# Patient Record
Sex: MALE | Race: WHITE | NOT HISPANIC OR LATINO | Employment: OTHER | URBAN - METROPOLITAN AREA
[De-identification: names, ages, dates, MRNs, and addresses within clinical notes are randomized per-mention and may not be internally consistent; named-entity substitution may affect disease eponyms.]

---

## 2023-05-15 DIAGNOSIS — M15.9 GENERALIZED OSTEOARTHRITIS: Primary | ICD-10-CM

## 2023-05-15 RX ORDER — MELOXICAM 15 MG/1
TABLET ORAL
Qty: 90 TABLET | Refills: 3 | Status: SHIPPED | OUTPATIENT
Start: 2023-05-15 | End: 2024-03-19 | Stop reason: SDUPTHER

## 2024-01-02 PROBLEM — D22.39 MELANOCYTIC NEVI OF OTHER PARTS OF FACE: Status: ACTIVE | Noted: 2023-03-09

## 2024-01-02 PROBLEM — I10 BENIGN ESSENTIAL HTN: Status: ACTIVE | Noted: 2024-01-02

## 2024-01-02 PROBLEM — D22.4 MELANOCYTIC NEVI OF SCALP AND NECK: Status: ACTIVE | Noted: 2023-03-09

## 2024-01-02 PROBLEM — D18.01 HEMANGIOMA OF SKIN AND SUBCUTANEOUS TISSUE: Status: ACTIVE | Noted: 2023-03-09

## 2024-01-02 PROBLEM — M19.049 ARTHRITIS, DEGENERATIVE, LOCALIZED, PRIMARY, HAND: Status: ACTIVE | Noted: 2024-01-02

## 2024-01-02 PROBLEM — D48.5 NEOPLASM OF UNCERTAIN BEHAVIOR OF SKIN: Status: ACTIVE | Noted: 2023-03-09

## 2024-01-02 PROBLEM — E78.2 COMBINED HYPERLIPIDEMIA: Status: ACTIVE | Noted: 2024-01-02

## 2024-01-02 PROBLEM — N40.0 BPH (BENIGN PROSTATIC HYPERPLASIA): Status: ACTIVE | Noted: 2024-01-02

## 2024-01-02 PROBLEM — L81.4 OTHER MELANIN HYPERPIGMENTATION: Status: ACTIVE | Noted: 2023-03-09

## 2024-01-02 PROBLEM — J01.90 ACUTE SINUSITIS: Status: ACTIVE | Noted: 2024-01-02

## 2024-01-02 PROBLEM — D22.5 MELANOCYTIC NEVI OF TRUNK: Status: ACTIVE | Noted: 2023-03-09

## 2024-01-02 PROBLEM — H91.90 HEARING LOSS: Status: ACTIVE | Noted: 2024-01-02

## 2024-01-02 PROBLEM — R79.89 ELEVATED SERUM CREATININE: Status: ACTIVE | Noted: 2024-01-02

## 2024-01-02 PROBLEM — Z85.828 PERSONAL HISTORY OF OTHER MALIGNANT NEOPLASM OF SKIN: Status: ACTIVE | Noted: 2023-03-09

## 2024-01-02 PROBLEM — L30.9 ECZEMA: Status: ACTIVE | Noted: 2024-01-02

## 2024-01-02 PROBLEM — L71.9 ROSACEA, UNSPECIFIED: Status: ACTIVE | Noted: 2023-03-09

## 2024-01-02 PROBLEM — R00.1 BRADYCARDIA: Status: ACTIVE | Noted: 2024-01-02

## 2024-01-02 PROBLEM — D22.60 MELANOCYTIC NEVI OF UNSPECIFIED UPPER LIMB, INCLUDING SHOULDER: Status: ACTIVE | Noted: 2023-03-09

## 2024-01-02 PROBLEM — I49.5 SINUS NODE DYSFUNCTION (MULTI): Status: ACTIVE | Noted: 2024-01-02

## 2024-01-02 PROBLEM — L57.9 SKIN CHANGES DUE TO CHRONIC EXPOSURE TO NONIONIZING RADIATION, UNSPECIFIED: Status: ACTIVE | Noted: 2023-03-09

## 2024-01-02 PROBLEM — I72.3 ILIAC ARTERY ANEURYSM (CMS-HCC): Status: ACTIVE | Noted: 2024-01-02

## 2024-01-02 PROBLEM — L57.0 ACTINIC KERATOSIS: Status: ACTIVE | Noted: 2023-03-09

## 2024-01-02 PROBLEM — J06.9 ACUTE UPPER RESPIRATORY INFECTION: Status: ACTIVE | Noted: 2024-01-02

## 2024-01-02 PROBLEM — M54.16 ACUTE LUMBAR RADICULOPATHY: Status: ACTIVE | Noted: 2024-01-02

## 2024-01-02 PROBLEM — M25.649 STIFFNESS OF FINGER JOINT: Status: ACTIVE | Noted: 2024-01-02

## 2024-01-02 PROBLEM — R21 RASH: Status: ACTIVE | Noted: 2024-01-02

## 2024-01-02 PROBLEM — D22.70 MELANOCYTIC NEVI OF UNSPECIFIED LOWER LIMB, INCLUDING HIP: Status: ACTIVE | Noted: 2023-03-09

## 2024-01-02 PROBLEM — K56.50 INTESTINAL ADHESIONS WITH OBSTRUCTION (MULTI): Status: ACTIVE | Noted: 2024-01-02

## 2024-01-02 PROBLEM — L90.5 SCAR CONDITION AND FIBROSIS OF SKIN: Status: ACTIVE | Noted: 2023-03-09

## 2024-01-02 PROBLEM — E78.5 DYSLIPIDEMIA: Status: ACTIVE | Noted: 2024-01-02

## 2024-01-03 ENCOUNTER — OFFICE VISIT (OUTPATIENT)
Dept: CARDIOLOGY | Facility: HOSPITAL | Age: 79
End: 2024-01-03
Payer: MEDICARE

## 2024-01-03 VITALS
BODY MASS INDEX: 24.48 KG/M2 | HEART RATE: 44 BPM | SYSTOLIC BLOOD PRESSURE: 130 MMHG | DIASTOLIC BLOOD PRESSURE: 73 MMHG | HEIGHT: 70 IN | WEIGHT: 171 LBS

## 2024-01-03 DIAGNOSIS — I72.3 ILIAC ARTERY ANEURYSM (CMS-HCC): ICD-10-CM

## 2024-01-03 DIAGNOSIS — R00.1 BRADYCARDIA: Primary | ICD-10-CM

## 2024-01-03 DIAGNOSIS — I10 BENIGN ESSENTIAL HTN: ICD-10-CM

## 2024-01-03 DIAGNOSIS — E78.5 DYSLIPIDEMIA: ICD-10-CM

## 2024-01-03 PROCEDURE — 1160F RVW MEDS BY RX/DR IN RCRD: CPT | Performed by: INTERNAL MEDICINE

## 2024-01-03 PROCEDURE — 93010 ELECTROCARDIOGRAM REPORT: CPT | Performed by: INTERNAL MEDICINE

## 2024-01-03 PROCEDURE — 1036F TOBACCO NON-USER: CPT | Performed by: INTERNAL MEDICINE

## 2024-01-03 PROCEDURE — 1159F MED LIST DOCD IN RCRD: CPT | Performed by: INTERNAL MEDICINE

## 2024-01-03 PROCEDURE — 3075F SYST BP GE 130 - 139MM HG: CPT | Performed by: INTERNAL MEDICINE

## 2024-01-03 PROCEDURE — 1126F AMNT PAIN NOTED NONE PRSNT: CPT | Performed by: INTERNAL MEDICINE

## 2024-01-03 PROCEDURE — 99214 OFFICE O/P EST MOD 30 MIN: CPT | Performed by: INTERNAL MEDICINE

## 2024-01-03 PROCEDURE — 3078F DIAST BP <80 MM HG: CPT | Performed by: INTERNAL MEDICINE

## 2024-01-03 PROCEDURE — 93005 ELECTROCARDIOGRAM TRACING: CPT | Performed by: INTERNAL MEDICINE

## 2024-01-03 RX ORDER — MELOXICAM 15 MG/1
15 TABLET ORAL DAILY
COMMUNITY
Start: 2014-10-21 | End: 2024-01-03 | Stop reason: SDUPTHER

## 2024-01-03 RX ORDER — MAG HYDROX/ALUMINUM HYD/SIMETH 400-400-40
1 SUSPENSION, ORAL (FINAL DOSE FORM) ORAL DAILY
COMMUNITY

## 2024-01-03 RX ORDER — MELATONIN 10 MG
1 CAPSULE ORAL NIGHTLY
COMMUNITY
Start: 2019-05-15

## 2024-01-03 RX ORDER — TAMSULOSIN HYDROCHLORIDE 0.4 MG/1
0.4 CAPSULE ORAL DAILY
COMMUNITY
Start: 2019-12-09 | End: 2024-02-04

## 2024-01-03 RX ORDER — LISINOPRIL AND HYDROCHLOROTHIAZIDE 20; 25 MG/1; MG/1
1 TABLET ORAL DAILY
COMMUNITY
Start: 2017-11-29 | End: 2024-02-04

## 2024-01-03 RX ORDER — TRIAMCINOLONE ACETONIDE 1 MG/G
1 CREAM TOPICAL 2 TIMES DAILY
COMMUNITY
Start: 2023-09-30

## 2024-01-03 RX ORDER — ATORVASTATIN CALCIUM 40 MG/1
40 TABLET, FILM COATED ORAL DAILY
COMMUNITY
Start: 2017-11-30 | End: 2024-02-05

## 2024-01-03 ASSESSMENT — PAIN SCALES - GENERAL: PAINLEVEL: 0-NO PAIN

## 2024-01-03 ASSESSMENT — ENCOUNTER SYMPTOMS
OCCASIONAL FEELINGS OF UNSTEADINESS: 0
DEPRESSION: 0
LOSS OF SENSATION IN FEET: 0

## 2024-01-03 NOTE — PROGRESS NOTES
Primary Care Physician: Monet Wheeler MD  Date of Visit: 01/03/2024 11:00 AM EST  Location of visit: [facility]     Chief Complaint:   Chief Complaint   Patient presents with    Follow-up     1 year fuv        HPI / Summary:   Enrique Myers is a 78 y.o. male presents for followup.  He has no cardiac complaints.  He is physically active and swims up to 1 mile 3 days a week in addition to walking without chest pain or shortness of breath.  The patient denies chest pain, shortness of breath, palpitations, lightheadedness, syncope, orthopnea, paroxysmal nocturnal dyspnea, lower extremity edema, or bleeding problems.          Past Medical History:   Diagnosis Date    Personal history of other malignant neoplasm of skin 10/14/2015    History of squamous cell carcinoma of skin        Past Surgical History:   Procedure Laterality Date    HERNIA REPAIR  11/11/2014    Inguinal Hernia Repair    TONSILLECTOMY  11/11/2014    Tonsillectomy          Social History:   reports that he has never smoked. He has never used smokeless tobacco. He reports current alcohol use of about 12.0 standard drinks of alcohol per week. He reports current drug use. Drug: Marijuana.     Family History:  family history is not on file.      Allergies:  No Known Allergies    Outpatient Medications:  Current Outpatient Medications   Medication Instructions    atorvastatin (LIPITOR) 40 mg, oral, Daily    lisinopriL-hydrochlorothiazide 20-25 mg tablet 1 tablet, oral, Daily    melatonin 10 mg capsule 1 capsule, oral, Nightly    meloxicam (Mobic) 15 mg tablet TAKE 1 TABLET BY MOUTH DAILY  WITH FOOD AS NEEDED    saw palmetto 450 mg capsule 1 capsule, oral, Daily    tamsulosin (FLOMAX) 0.4 mg, oral, Daily    triamcinolone (Kenalog) 0.1 % cream 1 Application, Topical, 2 times daily       Physical Exam:  Vitals:    01/03/24 1121   BP: 130/73   BP Location: Left arm   Patient Position: Sitting   BP Cuff Size: Adult   Pulse: (!) 44   Weight: 77.6  "kg (171 lb)   Height: 1.778 m (5' 10\")     Wt Readings from Last 5 Encounters:   01/03/24 77.6 kg (171 lb)   01/24/23 76.2 kg (168 lb 0.9 oz)   01/18/23 77.7 kg (171 lb 4 oz)   01/25/22 76.2 kg (168 lb)   01/12/22 77.6 kg (171 lb)     Body mass index is 24.54 kg/m².   General: Well-developed well-nourished in no acute distress  HEENT: Normocephalic atraumatic  Neck: Supple, JVP is normal negative hepatojugular reflux 2+ carotid pulses without bruit  Pulmonary: Normal respiratory effort, clear to auscultation  Cardiovascular: No right ventricular heave, normal S1 and S2, no murmurs rubs or gallops  Abdomen: Soft nontender nondistended  Extremities: Warm without edema 2+ radial pulses bilaterally   Neurologic: Alert and oriented x3  Psychiatric: Normal mood and affect     Last Labs:  CMP:  Recent Labs     01/19/23  1019 01/12/22  1006 01/06/21  1000    136 135*   K 4.0 4.0 4.0    97* 97*   CO2 29 29 27   ANIONGAP 12 14 15   BUN 19 19 21   CREATININE 1.03 1.10 1.09   GLUCOSE 84 97 107*     Recent Labs     01/19/23  1019 01/12/22  1006 01/06/21  1000   ALBUMIN 4.3 4.9 4.9   ALKPHOS 59 64 63   ALT 28 18 19   AST 25 18 20   BILITOT 0.9 0.7 0.9     CBC:  Recent Labs     01/19/23  1019 01/12/22  1006 01/06/21  1000   WBC 4.6 4.9 5.7   HGB 13.6 14.8 14.9   HCT 39.5* 42.4 43.7    195 217   MCV 87 89 90     COAG: No results for input(s): \"INR\", \"DDIMERVTE\" in the last 34462 hours.  HEME/ENDO:  Recent Labs     12/09/19  1259   HGBA1C 5.4      CARDIAC: No results for input(s): \"LDH\", \"CKMB\", \"TROPHS\", \"BNP\" in the last 73773 hours.    No lab exists for component: \"CK\", \"CKMBP\"  Recent Labs     01/19/23  1019 05/20/22  0830 01/31/22  0816   CHOL 141 135 160   LDLF 59 64 81   HDL 73.1 63.0 67.9   TRIG 47 40 54       Last Cardiology Tests:  ECG:  An electrocardiogram performed today that I reviewed shows sinus bradycardia with first-degree AV block.    Echo:         Cath:      Stress Test:           Cardiac " Imaging:  Abdominal ultrasound December 18, 2017  IMPRESSION:  1. Stable left common iliac aneurysm measuring 1.6 cm.  2. No aneurysm involving the abdominal aorta or right common iliac  artery.      Assessment/Plan   Diagnoses and all orders for this visit:  Bradycardia  -     ECG 12 lead (Clinic Performed)  Benign essential HTN  -     Comprehensive Metabolic Panel; Future  -     CBC; Future  Dyslipidemia  -     Lipid Panel; Future  Iliac artery aneurysm (CMS/HCC)  -     Vascular US aorta iliac duplex complete; Future    In summary Mr. Myers is a pleasant 78 year-old white male with a past medical history significant for hypertension, hyperlipidemia, and asymptomatic sinus bradycardia.  He is asymptomatic from a cardiac perspective.  He is physically active without symptoms.  His blood pressure is acceptable.  His last lipid profile was at goal.  I ordered labs as indicated above.  He should continue his current cardiovascular medications.  I ordered an abdominal ultrasound for surveillance of his left iliac artery aneurysm.  We will see him back in follow-up in 1 year.      Orders:  Orders Placed This Encounter   Procedures    Lipid Panel    Comprehensive Metabolic Panel    CBC    ECG 12 lead (Clinic Performed)      Followup Appts:  Future Appointments   Date Time Provider Department Center   3/19/2024 11:00 AM Monet Collazo MD [id number] [facility]   3/28/2024  2:45 PM Brain Vuong MD [id number] [facility]           ____________________________________________________________  Robb Garcia MD  [facility]  [facility]

## 2024-01-08 ENCOUNTER — LAB (OUTPATIENT)
Dept: LAB | Facility: LAB | Age: 79
End: 2024-01-08
Payer: MEDICARE

## 2024-01-08 DIAGNOSIS — E78.5 DYSLIPIDEMIA: ICD-10-CM

## 2024-01-08 DIAGNOSIS — I10 BENIGN ESSENTIAL HTN: ICD-10-CM

## 2024-01-08 LAB
ALBUMIN SERPL BCP-MCNC: 4.5 G/DL (ref 3.4–5)
ALP SERPL-CCNC: 66 U/L (ref 33–136)
ALT SERPL W P-5'-P-CCNC: 19 U/L (ref 10–52)
ANION GAP SERPL CALC-SCNC: 12 MMOL/L (ref 10–20)
AST SERPL W P-5'-P-CCNC: 20 U/L (ref 9–39)
BILIRUB SERPL-MCNC: 1 MG/DL (ref 0–1.2)
BUN SERPL-MCNC: 25 MG/DL (ref 6–23)
CALCIUM SERPL-MCNC: 9.9 MG/DL (ref 8.6–10.6)
CHLORIDE SERPL-SCNC: 101 MMOL/L (ref 98–107)
CHOLEST SERPL-MCNC: 148 MG/DL (ref 0–199)
CHOLESTEROL/HDL RATIO: 2.2
CO2 SERPL-SCNC: 28 MMOL/L (ref 21–32)
CREAT SERPL-MCNC: 1.12 MG/DL (ref 0.5–1.3)
EGFRCR SERPLBLD CKD-EPI 2021: 68 ML/MIN/1.73M*2
ERYTHROCYTE [DISTWIDTH] IN BLOOD BY AUTOMATED COUNT: 12.7 % (ref 11.5–14.5)
GLUCOSE SERPL-MCNC: 110 MG/DL (ref 74–99)
HCT VFR BLD AUTO: 41.5 % (ref 41–52)
HDLC SERPL-MCNC: 67 MG/DL
HGB BLD-MCNC: 13.8 G/DL (ref 13.5–17.5)
LDLC SERPL CALC-MCNC: 68 MG/DL
MCH RBC QN AUTO: 30.3 PG (ref 26–34)
MCHC RBC AUTO-ENTMCNC: 33.3 G/DL (ref 32–36)
MCV RBC AUTO: 91 FL (ref 80–100)
NON HDL CHOLESTEROL: 81 MG/DL (ref 0–149)
NRBC BLD-RTO: 0 /100 WBCS (ref 0–0)
PLATELET # BLD AUTO: 222 X10*3/UL (ref 150–450)
POTASSIUM SERPL-SCNC: 4.1 MMOL/L (ref 3.5–5.3)
PROT SERPL-MCNC: 6.6 G/DL (ref 6.4–8.2)
RBC # BLD AUTO: 4.55 X10*6/UL (ref 4.5–5.9)
SODIUM SERPL-SCNC: 137 MMOL/L (ref 136–145)
TRIGL SERPL-MCNC: 65 MG/DL (ref 0–149)
VLDL: 13 MG/DL (ref 0–40)
WBC # BLD AUTO: 5.3 X10*3/UL (ref 4.4–11.3)

## 2024-01-08 PROCEDURE — 85027 COMPLETE CBC AUTOMATED: CPT

## 2024-01-08 PROCEDURE — 80053 COMPREHEN METABOLIC PANEL: CPT

## 2024-01-08 PROCEDURE — 36415 COLL VENOUS BLD VENIPUNCTURE: CPT

## 2024-01-08 PROCEDURE — 80061 LIPID PANEL: CPT

## 2024-01-16 ENCOUNTER — APPOINTMENT (OUTPATIENT)
Dept: PRIMARY CARE | Facility: CLINIC | Age: 79
End: 2024-01-16
Payer: MEDICARE

## 2024-01-23 ENCOUNTER — APPOINTMENT (OUTPATIENT)
Dept: CARDIOLOGY | Facility: HOSPITAL | Age: 79
End: 2024-01-23
Payer: COMMERCIAL

## 2024-02-04 DIAGNOSIS — E78.5 DYSLIPIDEMIA: Primary | ICD-10-CM

## 2024-02-04 DIAGNOSIS — I10 BENIGN ESSENTIAL HTN: ICD-10-CM

## 2024-02-04 RX ORDER — LISINOPRIL AND HYDROCHLOROTHIAZIDE 20; 25 MG/1; MG/1
1 TABLET ORAL DAILY
Qty: 10 TABLET | Refills: 0 | Status: SHIPPED | OUTPATIENT
Start: 2024-02-04 | End: 2024-03-19 | Stop reason: SDUPTHER

## 2024-02-04 RX ORDER — TAMSULOSIN HYDROCHLORIDE 0.4 MG/1
0.4 CAPSULE ORAL NIGHTLY
Qty: 100 CAPSULE | Refills: 0 | Status: SHIPPED | OUTPATIENT
Start: 2024-02-04 | End: 2024-03-19 | Stop reason: SDUPTHER

## 2024-02-05 RX ORDER — ATORVASTATIN CALCIUM 40 MG/1
40 TABLET, FILM COATED ORAL NIGHTLY
Qty: 90 TABLET | Refills: 3 | Status: SHIPPED | OUTPATIENT
Start: 2024-02-05

## 2024-02-05 NOTE — RESULT ENCOUNTER NOTE
Lipids at goal. CBC was normal. CMP ok except for mildly elevated glucose. Follow up with primary physician.

## 2024-02-18 LAB
ATRIAL RATE: 44 BPM
P AXIS: 54 DEGREES
P OFFSET: 163 MS
P ONSET: 98 MS
PR INTERVAL: 244 MS
Q ONSET: 220 MS
QRS COUNT: 7 BEATS
QRS DURATION: 80 MS
QT INTERVAL: 420 MS
QTC CALCULATION(BAZETT): 359 MS
QTC FREDERICIA: 378 MS
R AXIS: 31 DEGREES
T AXIS: 41 DEGREES
T OFFSET: 430 MS
VENTRICULAR RATE: 44 BPM

## 2024-03-06 NOTE — PROGRESS NOTES
"Subjective     Enrique J Call \"Marcin\" is a 78 y.o. male who presents for the following: Skin Check.      Review of Systems:  No other skin or systemic complaints other than what is documented elsewhere in the note.    The following portions of the chart were reviewed this encounter and updated as appropriate:       Skin Cancer History  No skin cancer on file.    Specialty Problems          Dermatology Problems    Actinic keratosis    Hemangioma of skin and subcutaneous tissue    Melanocytic nevi of other parts of face    Melanocytic nevi of scalp and neck    Melanocytic nevi of trunk    Melanocytic nevi of unspecified lower limb, including hip    Melanocytic nevi of unspecified upper limb, including shoulder    Neoplasm of uncertain behavior of skin    Other melanin hyperpigmentation    Personal history of other malignant neoplasm of skin    Rosacea, unspecified    Scar condition and fibrosis of skin    Skin changes due to chronic exposure to nonionizing radiation, unspecified    Eczema    Rash       Past Dermatologic / Past Relevant Medical History:    - reported history of basal cell carcinoma with squamous features on left ear superior helix s/p Mohs surgery by Dr. Robb Barry in July 2007 and then by Dr. 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    Current Medications / CAM's:  "   Current Outpatient Medications:     atorvastatin (Lipitor) 40 mg tablet, TAKE 1 TABLET BY MOUTH IN THE  EVENING, Disp: 90 tablet, Rfl: 3    lisinopriL-hydrochlorothiazide 20-25 mg tablet, Take 1 tablet by mouth once daily., Disp: 10 tablet, Rfl: 0    melatonin 10 mg capsule, Take 1 capsule (10 mg) by mouth once daily at bedtime., Disp: , Rfl:     meloxicam (Mobic) 15 mg tablet, TAKE 1 TABLET BY MOUTH DAILY  WITH FOOD AS NEEDED, Disp: 90 tablet, Rfl: 3    saw palmetto 450 mg capsule, Take 1 capsule (450 mg) by mouth once daily., Disp: , Rfl:     tamsulosin (Flomax) 0.4 mg 24 hr capsule, Take 1 capsule (0.4 mg) by mouth once daily at bedtime. AT BEDTIME, Disp: 100 capsule, Rfl: 0    triamcinolone (Kenalog) 0.1 % cream, Apply 1 Application topically 2 times a day., Disp: , Rfl:      Objective   Well appearing patient in no apparent distress; mood and affect are within normal limits.    A full examination was performed including scalp, face, eyes, ears, nose, lips, neck, chest, axillae, abdomen, back, bilateral upper extremities, and bilateral lower extremities. All findings within normal limits unless otherwise noted below.    Assessment/Plan   1. Neoplasm of uncertain behavior of skin  Left Ear Mid-Helix  8 mm dark brown pigmented, asymmetric macule with an asymmetric pigment network and irregular borders           Shave removal    Lesion length (cm):  0.8  Margin per side (cm):  0  Lesion diameter (cm):  0.8  Informed consent: discussed and consent obtained    Timeout: patient name, date of birth, surgical site, and procedure verified    Procedure prep:  Patient was prepped and draped  Anesthesia: the lesion was anesthetized in a standard fashion    Anesthetic:  1% lidocaine w/ epinephrine 1-100,000 local infiltration  Instrument used: flexible razor blade    Hemostasis achieved with: aluminum chloride    Outcome: patient tolerated procedure well    Post-procedure details: sterile dressing applied and wound care  instructions given    Dressing type: bandage and petrolatum      Specimen 1 - Dermatopathology- DERM LAB  Differential Diagnosis: lentigo v AMH  Check Margins Yes/No?:    Comments:    Dermpath Lab: Routine Histopathology (formalin-fixed tissue)    2. Actinic keratosis (15)  Head - Anterior (Face) (15)  Scattered on the patient's face and bilateral ears, there are multiple erythematous, gritty, scaly macules     Actinic Keratoses -scattered on face and bilateral ears.  The pre-cancerous nature of these lesions and treatment options were discussed with the patient today.  At this time, I recommend treatment with liquid nitrogen cryotherapy.  The patient expressed understanding, is in agreement with this plan, and wishes to proceed with cryotherapy today.    Destr of lesion - Head - Anterior (Face)  Complexity: simple    Destruction method: cryotherapy    Informed consent: discussed and consent obtained    Lesion destroyed using liquid nitrogen: Yes    Cryotherapy cycles:  1  Outcome: patient tolerated procedure well with no complications    Post-procedure details: wound care instructions given      3. Melanocytic nevus of trunk  Scattered on the patient's face, neck, trunk, and extremities, there are several small, round- to oval-shaped, brown-pigmented and pink-colored, symmetric, uniform-appearing macules and dome-shaped papules    Clinically benign- to slightly atypical-appearing nevi - the clinically benign- to slightly atypical-appearing nature of the patient's nevi was discussed with the patient today.  None of the patient's nevi meet threshold for biopsy today.  I emphasized the importance of performing monthly self-skin exams using the ABCDs of monitoring moles, which were reviewed with the patient today and an informational hand-out provided.  I also emphasized the importance of sun avoidance and sun protection with daily sunscreen use.  The patient expressed understanding and is in agreement with this  plan.    4. Seborrheic keratosis  Scattered on the patient's face, neck, trunk, and extremities, there are multiple tan- to light brown-colored, hyperkeratotic, stuck-on appearing papules of varying size and shape    Seborrheic Keratoses - the benign nature of these lesions was discussed with the patient today and reassurance provided.  No treatment is medically indicated for these lesions at this time.    5. Dermatitis  Right Flank  On the patient's bilateral flanks, the left worse than the right, there are multiple erythematous, scaly papules coalescing into several ill-defined, slightly lichenified, thin plaques    Eczema - flare on bilateral flanks.  The potentially chronic and intermittently flaring nature of this condition and treatment options were discussed extensively with the patient today.  At this time, I recommend topical steroid therapy with Triamcinolone 0.1% cream, which the patient was instructed to apply twice daily to the affected areas of his flanks (avoid face, groin, body folds) for the next 2 weeks, followed by taper to twice daily on weekends only for persistent areas; the patient may repeat treatment in a 2-week burst-and-taper fashion every 6-8 weeks as needed for future flares.  I also emphasized the importance of dry, sensitive skin care, including the use of a mild soap, such as Dove, and frequent and aggressive moisturization, at least twice daily and immediately following showers or baths, with recommended over-the-counter moisturizing creams, such as Eucerin, Cetaphil, Cerave, or Aveeno, or Vaseline or Aquaphor ointments.  The risks, benefits, and side effects of this medication, including possible skin atrophy with overuse of topical steroids, were discussed.  The patient expressed understanding and is in agreement with this plan.    6. History of nonmelanoma skin cancer  On the patient's left ear superior helix and mid lower back, there are well-healed scars with no evidence of  recurrent growth on exam today.    History of nonmelanoma skin cancer, dysplastic nevus, and actinic keratoses and photodamage.  There is no evidence of recurrence on exam today.  The signs and symptoms of skin cancer were reviewed and the patient was advised to practice sun protection and sun avoidance, use daily sunscreen, and perform regular self skin exams.  I will have the patient return to our office in 6 to 12 months, pending the above biopsy result, for routine follow-up and skin exam, and the patient was instructed to call our office should the patient notice any new, changing, symptomatic, or otherwise concerning skin lesions before then.  The patient expressed understanding and is in agreement with this plan.    7. Diffuse photodamage of skin  Photodistributed  Diffuse photodamage with actinic changes with telangiectasia and mottled pigmentation in sun-exposed areas.    Photodamage.  The signs and symptoms of skin cancer were reviewed and the patient was advised to practice sun protection and sun avoidance, use daily sunscreen, and perform regular self skin exams.  Sun protection was discussed, including avoiding the mid-day sun, wearing a sunscreen with SPF at least 50, and stressing the need for reapplication of sunscreen and applying more than they think they need.

## 2024-03-07 ENCOUNTER — OFFICE VISIT (OUTPATIENT)
Dept: DERMATOLOGY | Facility: CLINIC | Age: 79
End: 2024-03-07
Payer: MEDICARE

## 2024-03-07 DIAGNOSIS — Z85.828 HISTORY OF NONMELANOMA SKIN CANCER: ICD-10-CM

## 2024-03-07 DIAGNOSIS — L82.1 SEBORRHEIC KERATOSIS: ICD-10-CM

## 2024-03-07 DIAGNOSIS — L57.0 ACTINIC KERATOSIS: ICD-10-CM

## 2024-03-07 DIAGNOSIS — L57.8 DIFFUSE PHOTODAMAGE OF SKIN: ICD-10-CM

## 2024-03-07 DIAGNOSIS — L30.9 DERMATITIS: ICD-10-CM

## 2024-03-07 DIAGNOSIS — D22.5 MELANOCYTIC NEVUS OF TRUNK: ICD-10-CM

## 2024-03-07 DIAGNOSIS — D48.5 NEOPLASM OF UNCERTAIN BEHAVIOR OF SKIN: Primary | ICD-10-CM

## 2024-03-07 PROCEDURE — 99214 OFFICE O/P EST MOD 30 MIN: CPT | Performed by: DERMATOLOGY

## 2024-03-07 PROCEDURE — 11311 SHAVE SKIN LESION 0.6-1.0 CM: CPT | Performed by: DERMATOLOGY

## 2024-03-07 PROCEDURE — 88342 IMHCHEM/IMCYTCHM 1ST ANTB: CPT | Performed by: DERMATOLOGY

## 2024-03-07 PROCEDURE — 1126F AMNT PAIN NOTED NONE PRSNT: CPT | Performed by: DERMATOLOGY

## 2024-03-07 PROCEDURE — 88305 TISSUE EXAM BY PATHOLOGIST: CPT | Performed by: DERMATOLOGY

## 2024-03-07 PROCEDURE — 1159F MED LIST DOCD IN RCRD: CPT | Performed by: DERMATOLOGY

## 2024-03-07 PROCEDURE — 1036F TOBACCO NON-USER: CPT | Performed by: DERMATOLOGY

## 2024-03-07 PROCEDURE — 17004 DESTROY PREMAL LESIONS 15/>: CPT | Performed by: DERMATOLOGY

## 2024-03-07 PROCEDURE — 1160F RVW MEDS BY RX/DR IN RCRD: CPT | Performed by: DERMATOLOGY

## 2024-03-07 ASSESSMENT — DERMATOLOGY PATIENT ASSESSMENT
ARE YOU AN ORGAN TRANSPLANT RECIPIENT: NO
DO YOU HAVE ANY NEW OR CHANGING LESIONS: NO
DO YOU USE SUNSCREEN: OCCASIONALLY
DO YOU USE A TANNING BED: NO

## 2024-03-07 ASSESSMENT — DERMATOLOGY QUALITY OF LIFE (QOL) ASSESSMENT: ARE THERE EXCLUSIONS OR EXCEPTIONS FOR THE QUALITY OF LIFE ASSESSMENT: NO

## 2024-03-07 ASSESSMENT — ITCH NUMERIC RATING SCALE: HOW SEVERE IS YOUR ITCHING?: 0

## 2024-03-13 LAB
LAB AP ASR DISCLAIMER: NORMAL
LABORATORY COMMENT REPORT: NORMAL
PATH REPORT.FINAL DX SPEC: NORMAL
PATH REPORT.GROSS SPEC: NORMAL
PATH REPORT.RELEVANT HX SPEC: NORMAL
PATH REPORT.TOTAL CANCER: NORMAL

## 2024-03-19 ENCOUNTER — OFFICE VISIT (OUTPATIENT)
Dept: PRIMARY CARE | Facility: CLINIC | Age: 79
End: 2024-03-19
Payer: MEDICARE

## 2024-03-19 VITALS
WEIGHT: 169 LBS | SYSTOLIC BLOOD PRESSURE: 102 MMHG | DIASTOLIC BLOOD PRESSURE: 67 MMHG | BODY MASS INDEX: 23.66 KG/M2 | HEIGHT: 71 IN | OXYGEN SATURATION: 96 % | HEART RATE: 57 BPM

## 2024-03-19 DIAGNOSIS — Z12.5 ENCOUNTER FOR PROSTATE CANCER SCREENING: ICD-10-CM

## 2024-03-19 DIAGNOSIS — M15.9 GENERALIZED OSTEOARTHRITIS: ICD-10-CM

## 2024-03-19 DIAGNOSIS — R73.9 BORDERLINE HYPERGLYCEMIA: Primary | ICD-10-CM

## 2024-03-19 DIAGNOSIS — C43.9 MELANOMA OF SKIN (MULTI): ICD-10-CM

## 2024-03-19 DIAGNOSIS — Z12.11 COLON CANCER SCREENING: ICD-10-CM

## 2024-03-19 DIAGNOSIS — I10 BENIGN ESSENTIAL HTN: ICD-10-CM

## 2024-03-19 PROCEDURE — 99213 OFFICE O/P EST LOW 20 MIN: CPT | Performed by: INTERNAL MEDICINE

## 2024-03-19 PROCEDURE — 3074F SYST BP LT 130 MM HG: CPT | Performed by: INTERNAL MEDICINE

## 2024-03-19 PROCEDURE — 1159F MED LIST DOCD IN RCRD: CPT | Performed by: INTERNAL MEDICINE

## 2024-03-19 PROCEDURE — 3078F DIAST BP <80 MM HG: CPT | Performed by: INTERNAL MEDICINE

## 2024-03-19 PROCEDURE — G0439 PPPS, SUBSEQ VISIT: HCPCS | Performed by: INTERNAL MEDICINE

## 2024-03-19 PROCEDURE — 1160F RVW MEDS BY RX/DR IN RCRD: CPT | Performed by: INTERNAL MEDICINE

## 2024-03-19 PROCEDURE — 1036F TOBACCO NON-USER: CPT | Performed by: INTERNAL MEDICINE

## 2024-03-19 PROCEDURE — 1170F FXNL STATUS ASSESSED: CPT | Performed by: INTERNAL MEDICINE

## 2024-03-19 RX ORDER — MELOXICAM 15 MG/1
15 TABLET ORAL DAILY
Qty: 90 TABLET | Refills: 3 | Status: SHIPPED | OUTPATIENT
Start: 2024-03-19

## 2024-03-19 RX ORDER — TAMSULOSIN HYDROCHLORIDE 0.4 MG/1
0.4 CAPSULE ORAL NIGHTLY
Qty: 90 CAPSULE | Refills: 3 | Status: SHIPPED | OUTPATIENT
Start: 2024-03-19 | End: 2025-03-19

## 2024-03-19 RX ORDER — LISINOPRIL AND HYDROCHLOROTHIAZIDE 20; 25 MG/1; MG/1
1 TABLET ORAL DAILY
Qty: 90 TABLET | Refills: 3 | Status: SHIPPED | OUTPATIENT
Start: 2024-03-19

## 2024-03-19 ASSESSMENT — PATIENT HEALTH QUESTIONNAIRE - PHQ9
SUM OF ALL RESPONSES TO PHQ9 QUESTIONS 1 AND 2: 0
2. FEELING DOWN, DEPRESSED OR HOPELESS: NOT AT ALL
1. LITTLE INTEREST OR PLEASURE IN DOING THINGS: NOT AT ALL

## 2024-03-19 ASSESSMENT — ACTIVITIES OF DAILY LIVING (ADL)
MANAGING_FINANCES: INDEPENDENT
BATHING: INDEPENDENT
TAKING_MEDICATION: INDEPENDENT
GROCERY_SHOPPING: INDEPENDENT
DOING_HOUSEWORK: INDEPENDENT
DRESSING: INDEPENDENT

## 2024-03-19 ASSESSMENT — ENCOUNTER SYMPTOMS
LOSS OF SENSATION IN FEET: 0
OCCASIONAL FEELINGS OF UNSTEADINESS: 0
DEPRESSION: 0

## 2024-03-19 NOTE — PATIENT INSTRUCTIONS
Marcin,     We are doing a few labs today - the PSA test for prostate cancer screening and a hemoglobin A1C to be sure you have no diabetes or pre-diabetes.   Call [phone] to schedule a colonoscopy with Dr. Kapadia.     CL

## 2024-03-19 NOTE — PROGRESS NOTES
"Marcin Call is a 77 yo M presenting for wellness visit.     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     HTN, bradycardia, DLD - atorva 40, hydrochlorothiazide-lisinopril, FU cardiology (Garcia)     LUTS - tamsulosin     Chronic low back pain, lumbar DJD - meloicam PRN     New dx very early stage melanoma by Dr. Vuong pending Mohs later in the spring     #HM   -screen labs 1.2024   -due PSA   -cscope 4.2019 - due (Steffi)   -shingrix x2   -pneumovax/prevnar 13 UTD     "

## 2024-03-20 DIAGNOSIS — Z12.11 COLON CANCER SCREENING: ICD-10-CM

## 2024-03-20 RX ORDER — POLYETHYLENE GLYCOL 3350, SODIUM SULFATE ANHYDROUS, SODIUM BICARBONATE, SODIUM CHLORIDE, POTASSIUM CHLORIDE 236; 22.74; 6.74; 5.86; 2.97 G/4L; G/4L; G/4L; G/4L; G/4L
4000 POWDER, FOR SOLUTION ORAL ONCE
Qty: 4000 ML | Refills: 0 | Status: SHIPPED | OUTPATIENT
Start: 2024-03-20 | End: 2024-03-20

## 2024-03-20 NOTE — TUMOR BOARD NOTE
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    Recommendation:  Mohs surgery.    Review Multidisciplinary Cutaneous Oncology Conference recommendation with patient.  Continue routine follow up and total body skin exams with Brain Vuong.    Follow Up:  Brain Vuong, Derm Surgery.      History and Physical Exam  Dermatologic History:   78 y.o. 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    Past Medical History:    Past Medical History:   Diagnosis Date    Personal history of other malignant neoplasm of skin 10/14/2015    History of squamous cell carcinoma of skin       Skin:  Left Ear Mid-Helix  8 mm dark brown pigmented, asymmetric macule with an asymmetric pigment network and irregular borders         Pathology  Dermatopathology- DERM LAB: [id number]  Order: [id number]  Collected 3/7/2024 15:09       Status: Final result       Visible to patient: Yes (seen)       Dx: Neoplasm of uncertain behavior of skin    1 Result Note      Component    FINAL DIAGNOSIS   SKIN, LEFT EAR MID-HELIX, SHAVE EXCISION:  ATYPICAL MELANOCYTIC HYPERPLASIA, SEE NOTE.     Note: Microscopic examination reveals a specimen that extends into the superficial dermis. There is dense solar elastosis and there is heavy epidermal melanin pigmentation in the lower epidermis. A SOX-10 stain reveals areas of moderately increased single melanocytes with moderately enlarged nuclei and occasional pagetoid extension. All control slides stain appropriately.     With the cytologic atypia and the degree of  increased melanocytes, while some of this specimen has features of a lentigo, an early melanoma in situ of the lentigo maligna type cannot be excluded and a complete re-excision is recommended.     ** Electronically signed out by Rosa Isela Garcia MD **

## 2024-03-21 ENCOUNTER — LAB (OUTPATIENT)
Dept: LAB | Facility: LAB | Age: 79
End: 2024-03-21
Payer: MEDICARE

## 2024-03-21 DIAGNOSIS — R73.9 BORDERLINE HYPERGLYCEMIA: ICD-10-CM

## 2024-03-21 DIAGNOSIS — Z12.5 ENCOUNTER FOR PROSTATE CANCER SCREENING: ICD-10-CM

## 2024-03-21 LAB
EST. AVERAGE GLUCOSE BLD GHB EST-MCNC: 105 MG/DL
HBA1C MFR BLD: 5.3 %

## 2024-03-21 PROCEDURE — 83036 HEMOGLOBIN GLYCOSYLATED A1C: CPT

## 2024-03-21 PROCEDURE — G0103 PSA SCREENING: HCPCS

## 2024-03-21 PROCEDURE — 84154 ASSAY OF PSA FREE: CPT

## 2024-03-21 PROCEDURE — 36415 COLL VENOUS BLD VENIPUNCTURE: CPT

## 2024-03-23 LAB
PSA FREE MFR SERPL: 50 %
PSA FREE SERPL-MCNC: 0.3 NG/ML
PSA SERPL IA-MCNC: 0.6 NG/ML (ref 0–4)

## 2024-03-25 ENCOUNTER — TUMOR BOARD CONFERENCE (OUTPATIENT)
Dept: HEMATOLOGY/ONCOLOGY | Facility: HOSPITAL | Age: 79
End: 2024-03-25
Payer: MEDICARE

## 2024-03-28 ENCOUNTER — APPOINTMENT (OUTPATIENT)
Dept: DERMATOLOGY | Facility: CLINIC | Age: 79
End: 2024-03-28
Payer: MEDICARE

## 2024-04-30 ENCOUNTER — PROCEDURE VISIT (OUTPATIENT)
Dept: DERMATOLOGY | Facility: CLINIC | Age: 79
End: 2024-04-30
Payer: MEDICARE

## 2024-04-30 DIAGNOSIS — C43.9 MALIGNANT MELANOMA, UNSPECIFIED SITE (MULTI): ICD-10-CM

## 2024-04-30 PROCEDURE — 99214 OFFICE O/P EST MOD 30 MIN: CPT | Performed by: DERMATOLOGY

## 2024-04-30 PROCEDURE — 88342 IMHCHEM/IMCYTCHM 1ST ANTB: CPT | Performed by: DERMATOLOGY

## 2024-04-30 PROCEDURE — 17311 MOHS 1 STAGE H/N/HF/G: CPT | Performed by: DERMATOLOGY

## 2024-04-30 PROCEDURE — 17312 MOHS ADDL STAGE: CPT | Performed by: DERMATOLOGY

## 2024-04-30 NOTE — PROGRESS NOTES
"Melanoma Mohs Surgery Consult Note    Date of Surgery:  4/30/2024  Surgeon:  Librado King MD  Office Location: [address]  [facility] [address]  Dept: [phone]  Dept Fax: [phone]   Referring Provider: Librado King MD  [address] [place] [address]   Deny Thomas [place], [facility] [address]  [place] [id number]     Subjective   Enrique J Call \"Marcin\" is a 78 y.o. male who presents for the following: MOHS Surgery for melanoma.    According to the patient, the lesion has been present for approximately 6 months at the time of diagnosis.  The lesion is not causing symptoms.  The lesion has not been treated previously.    The patient does not have a pacemaker / defibrillator.  The patient does not have a heart valve / joint replacement.    The patient is not on blood thinners.  The patient does not have a history of hepatitis B or C.  The patient does not have a history of HIV.  The patient does not have a history of immunosuppression (e.g. organ transplantation, malignancy, medications)    The following portions of the chart were reviewed this encounter and updated as appropriate:       Review of Systems:  No other skin or systemic complaints other than what is documented elsewhere in the note.    Medical History:  Clinically relevant history including significant past medical history, review of systems, medications and allergies was reviewed and is documented in Epic.    Objective   Well appearing patient in no apparent distress; mood and affect are within normal limits.  Vital signs: See record.  Noted on the   The patient confirmed the identified site.    Discussion:  The nature of the diagnosis was explained. The lesion is an early melanoma but is likely to have been present for >1 year and is likely to progress without treatment. The multidisciplinary cutaneous oncology tumor board report was reviewed with the patient and surgery is recommended. The patient was informed " that based on the depth and lack of ulceration, a sentinel lymph node biopsy is not indicated. However, the melanoma may be upstaged after excision following histopathologic examination, which may require additional treatment. Warning signs of melanoma were discussed. We recommended that the patient have regular total body skin exams given increased risk of skin cancers. The patient was instructed to use sun protective behaviors including use of broad spectrum sunscreens and sun protective clothing to reduce the risk of skin cancers.     Risks, benefits, side effects of Mohs surgery were discussed with patient and the patient voiced understanding.  It was explained that even though the cure rate of Mohs is very high it is not 100%. Risks of surgery including but not limited to bleeding, infection, numbness, nerve damage, and scar were reviewed.  Discussion included wound care requirements, activity restrictions, likely scar outcome and time to heal.     After Mohs surgery, the defect may need to be repaired surgically and the scar may be longer than the original lesion.  Reconstruction options, risks, and benefits were reviewed including second intention healing, linear repair (4-1 ratio was explained), local flaps, skin grafts, cartilage grafts and interpolation flaps (the need for multiple surgeries was explained). Possible outcomes were reviewed including likely scar appearance, failure of flap survival, infection, bleeding and the need for revision surgery.     Medical Decision Making:    Column 1 - chronic illness with progression  Column 2 - category 3: discussion of management with external physicians (multidisciplinary tumor board)  Column 3 - decision regarding minor surgery with identified risk factors (bleeding, infection, scarring)      The melanoma in-situ.  The multidisciplinary tumor board report, pathology was reviewed, the photograph was reviewed, and the referring physicians note was reviewed.   Multiple treatment options including mohs surgery (which has moderate risk of morbidity) were reviewed.    Medical Decision Making  Column 1- Melanoma In-situ  (1 acute complicated illness- Moderate)  Column 2- 4 tests reviewed (multidisciplinary tumor board, pathology, photograph, refering physician notes- Moderate)  Column 3- Modertate risk of morbidity from additional treatment- mohs surgry- Moderate)    Overall Moderate MDM

## 2024-04-30 NOTE — PROGRESS NOTES
Mohs Surgery Operative Note    Date of Surgery:  4/30/2024  Surgeon:  Librado King MD  Office Location: [address]  [place] [address]  Dept: [phone]  Dept Fax: [phone]  Referring Provider: Brain Vuong MD  [address] [place] [address] Dr Deny Thomas [place], [address] [address]      Assessment/Plan   Pre-procedure:   Obtained informed consent: written from patient  The surgical site was identified and confirmed with the patient.     Intra-operative:   Audible time out called at : 2:01 PM 04/30/24  by: Lora Okeefe MA   Verified patient name, birthdate, site, specimen bottle label & requisition.    The planned procedure(s) was again reviewed with the patient. The risks of bleeding, infection, nerve damage and scarring were reviewed. Written authorization was obtained. The patient identity, surgical site, and planned procedure(s) were verified. The provider acted as both surgeon and pathologist.     Malignant melanoma, unspecified site (Multi)  Left Ear Mid Helix    Mohs surgery    Consent obtained: written    Universal Protocol:  Procedure explained and questions answered to patient or proxy's satisfaction: Yes    Test results available and properly labeled: Yes    Pathology report reviewed: Yes    External notes reviewed: Yes    Photo or diagram used for site identification: Yes    Site/side marked: Yes    Slide independently reviewed by Mohs surgeon: Yes    Immediately prior to procedure a time out was called: Yes    Patient identity confirmed: verbally with patient  Preparation: Patient was prepped and draped in usual sterile fashion      Anticoagulation:  Is the patient taking prescription anticoagulant and/or aspirin prescribed/recommended by a physician? No    Was the anticoagulation regimen changed prior to Mohs? No      Anesthesia:  Anesthesia method: local infiltration  Local anesthetic: lidocaine 1% WITH epi    Procedure  Details:  Biopsy accession number: [id number]  Date of biopsy: 3/7/2024  Pre-Op diagnosis: melanoma  Melanoma subtype: in situ  Melanoma histologic subtype: lentigo maligna  Surgery side: left  Surgical site (from skin exam): Left Ear Mid Helix  Pre-operative length (cm): 0.7  Pre-operative width (cm): 0.4  Indications for Mohs surgery: anatomic location where tissue conservation is critical  Previously treated? No      Micrographic Surgery Details:  Post-operative length (cm): 1  Post-operative width (cm): 0.7  Number of Mohs stages: 2    Stage 1     Comments: The patient was brought into the operating room and placed in the procedure chair in the appropriate position.  The area positive by previous biopsy was identified and confirmed with the patient. The area of clinically obvious tumor was debulked using a curette and/or scalpel as needed. An incision was made following the Mohs approach through the skin. The specimen was taken to the lab, divided into 2 piece(s) and appropriately chromacoded and processed.        .  Tumor was seen on the lateral margins as indicated on the Mohs map.   Flushing. Histologic examination revealed greater than 10 atypical melanocytes in a row lining the basement membrane.         Tumor features identified on Mohs section: melanoma     Depth of invasion comment: epidsermis     Immunohistochemical stains utilized: MART-1    Stage 2     Comments: The area of positivity as noted on the Mohs map in the previous stage was identified and removed using the Mohs technique. The specimen was taken to the lab and appropriately chromacoded and processed in  piece(s).    Note:  control from superior ear was compared to above layer and found to be similar.       Tumor features identified on Mohs section: no tumor identified     Immunohistochemical stains utilized: MART-1    Patient tolerance of procedure: tolerated well, no immediate complications    Reconstruction:  Was the defect  reconstructed?: No    Fine/surface layer approximation (top stitches)   Hemostasis achieved with: electrodesiccation  Outcome: patient tolerated procedure well with no complications    Post-procedure details: sterile dressing applied and wound care instructions given    Dressing type: Gelfoam, Telfa pad and pressure dressing    Additional details:  Melanoma Donald: MIS  Curative Intent: Yes  Original Breslow Thickness:   Clinical margin width: Other - Mohs, individual anatomic or functional considerations per the NCCN guidelines  Depth of excision: Other - Mohs, individual anatomic or functional considerations per the NCCN guidelines  Discussion/Procedural Comments:   No Debulk.    Repair: After a discussion with the patient regarding the options for wound closure, a decision was made to proceed with second intention healing.  Dressing F/U: Surgifoam was placed in the wound. A pressure dressing was placed to help stabilize the wound and to minimize the risk of postoperative bleeding. Wound care was discussed, and the patient was given written post-operative wound care instructions.         The final repair measured 1.0 x 0.7 cm          Wound care was discussed, and the patient was given written post-operative wound care instructions.      The patient will follow up with Librado King MD as needed for any post operative problems or concerns, and will follow up with their primary dermatologist as scheduled.       Intact

## 2024-05-15 ENCOUNTER — OFFICE VISIT (OUTPATIENT)
Dept: GASTROENTEROLOGY | Facility: EXTERNAL LOCATION | Age: 79
End: 2024-05-15
Payer: MEDICARE

## 2024-05-15 DIAGNOSIS — Z80.0 FAMILY HISTORY OF COLON CANCER: Primary | ICD-10-CM

## 2024-05-15 DIAGNOSIS — Z12.11 COLON CANCER SCREENING: ICD-10-CM

## 2024-05-15 PROCEDURE — G0105 COLORECTAL SCRN; HI RISK IND: HCPCS | Performed by: INTERNAL MEDICINE

## 2024-05-15 PROCEDURE — 1159F MED LIST DOCD IN RCRD: CPT | Performed by: INTERNAL MEDICINE

## 2024-05-15 PROCEDURE — 1160F RVW MEDS BY RX/DR IN RCRD: CPT | Performed by: INTERNAL MEDICINE

## 2024-05-15 NOTE — PROGRESS NOTES
Patient underwent colonoscopy for high risk screening that showed diverticulosis and hemorrhoids.    He should have repeat in 5 years.

## 2024-06-24 ENCOUNTER — TUMOR BOARD CONFERENCE (OUTPATIENT)
Dept: HEMATOLOGY/ONCOLOGY | Facility: HOSPITAL | Age: 79
End: 2024-06-24
Payer: MEDICARE

## 2024-06-24 DIAGNOSIS — C43.9 MELANOMA OF SKIN (MULTI): ICD-10-CM

## 2024-06-24 NOTE — TUMOR BOARD NOTE
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 S/p two stages of Mohs surgery with complete clearance. Adequately surgically treated. No debulking layer.    Recommendation:  Annual H&P.    Review Multidisciplinary Cutaneous Oncology Conference recommendation with patient.  Continue routine follow up and total body skin exams with Brain Vuong.    Follow Up:  Brain Vuong      History and Physical Exam  Dermatologic History:   78 y.o. 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 He underwent two stages of Mohs surgery on 4/30/2024 with complete clearance. There was no debulking layer.    Past Medical History:    Past Medical History:   Diagnosis Date    Personal history of other malignant neoplasm of skin 10/14/2015    History of squamous cell carcinoma of skin       Skin:  Left Ear Mid-Helix  8 mm dark brown pigmented, asymmetric macule with an asymmetric pigment network and irregular borders         Pathology  Dermatopathology- DERM LAB: [id number]  Order: [id number]  Collected 3/7/2024 15:09       Status: Final result       Visible to patient: Yes (seen)       Dx: Neoplasm of uncertain behavior of skin    1 Result Note      Component    FINAL DIAGNOSIS   SKIN, LEFT EAR MID-HELIX, SHAVE EXCISION:  ATYPICAL MELANOCYTIC HYPERPLASIA, SEE NOTE.     Note: Microscopic examination reveals a specimen that extends into the superficial dermis. There is dense solar elastosis and there is heavy epidermal melanin pigmentation in the lower epidermis. A SOX-10 stain  reveals areas of moderately increased single melanocytes with moderately enlarged nuclei and occasional pagetoid extension. All control slides stain appropriately.     With the cytologic atypia and the degree of increased melanocytes, while some of this specimen has features of a lentigo, an early melanoma in situ of the lentigo maligna type cannot be excluded and a complete re-excision is recommended.     ** Electronically signed out by Rosa Isela Garcia MD **

## 2024-10-06 DIAGNOSIS — E78.5 DYSLIPIDEMIA: Primary | ICD-10-CM

## 2024-10-07 RX ORDER — ATORVASTATIN CALCIUM 40 MG/1
40 TABLET, FILM COATED ORAL NIGHTLY
Qty: 100 TABLET | Refills: 2 | Status: SHIPPED | OUTPATIENT
Start: 2024-10-07

## 2024-10-09 ENCOUNTER — APPOINTMENT (OUTPATIENT)
Dept: PRIMARY CARE | Facility: CLINIC | Age: 79
End: 2024-10-09
Payer: MEDICARE

## 2024-10-09 VITALS
HEART RATE: 55 BPM | DIASTOLIC BLOOD PRESSURE: 74 MMHG | OXYGEN SATURATION: 97 % | SYSTOLIC BLOOD PRESSURE: 134 MMHG | HEIGHT: 70 IN | WEIGHT: 171.4 LBS | BODY MASS INDEX: 24.54 KG/M2 | TEMPERATURE: 98.1 F

## 2024-10-09 DIAGNOSIS — E55.9 VITAMIN D DEFICIENCY: ICD-10-CM

## 2024-10-09 DIAGNOSIS — G47.00 INSOMNIA, UNSPECIFIED TYPE: ICD-10-CM

## 2024-10-09 DIAGNOSIS — R73.09 ELEVATED GLUCOSE: ICD-10-CM

## 2024-10-09 DIAGNOSIS — Z00.00 ROUTINE HEALTH MAINTENANCE: Primary | ICD-10-CM

## 2024-10-09 DIAGNOSIS — I10 BENIGN ESSENTIAL HTN: ICD-10-CM

## 2024-10-09 PROBLEM — E78.5 HYPERLIPIDEMIA: Status: ACTIVE | Noted: 2024-01-02

## 2024-10-09 PROCEDURE — 1159F MED LIST DOCD IN RCRD: CPT | Performed by: INTERNAL MEDICINE

## 2024-10-09 PROCEDURE — 3074F SYST BP LT 130 MM HG: CPT | Performed by: INTERNAL MEDICINE

## 2024-10-09 PROCEDURE — 3078F DIAST BP <80 MM HG: CPT | Performed by: INTERNAL MEDICINE

## 2024-10-09 PROCEDURE — 99215 OFFICE O/P EST HI 40 MIN: CPT | Performed by: INTERNAL MEDICINE

## 2024-10-09 PROCEDURE — G0009 ADMIN PNEUMOCOCCAL VACCINE: HCPCS | Performed by: INTERNAL MEDICINE

## 2024-10-09 PROCEDURE — 90677 PCV20 VACCINE IM: CPT | Performed by: INTERNAL MEDICINE

## 2024-10-09 PROCEDURE — 1160F RVW MEDS BY RX/DR IN RCRD: CPT | Performed by: INTERNAL MEDICINE

## 2024-10-09 ASSESSMENT — ENCOUNTER SYMPTOMS
LOSS OF SENSATION IN FEET: 0
DEPRESSION: 0
OCCASIONAL FEELINGS OF UNSTEADINESS: 0

## 2024-10-09 NOTE — PROGRESS NOTES
"Chief Complaint   Patient presents with    [facility]     NPV         History Of Present Illness:    Enrique Myers \"Marcin\" is a 79 y.o. male presenting to [facility] and update health maintenance.  Marcin has HTN, HLD and BPH and low back pain.  He has L3-4 lumbar disc herniation managed with daily meloxicam.    He had SBO 11 years ago - emergency surgery and no recurrence   Former runner, had to stop after back injury - resting pulse 60 then found to be lower.  Now 48 is baseline. Sees Dr. Garcia   Able to do 3 flights of stairs without shortness of breath.    Hiking and swimming for exercise - 100 m warm up, swims half mile, rest then 300 meters (0.25mi)  Nocturia every 2-3 hours, less frequent during the day.  Some stop and start  Having difficulty with sleep.  Sleeping on shoulders and arm will fall asleep above elbow    strength down, but nothing sudden       Active Medical Problems:  Patient Active Problem List    Diagnosis Date Noted    Routine health maintenance 10/10/2024    Vitamin D deficiency 10/10/2024    Elevated glucose 10/10/2024    Insomnia 10/10/2024    Low back pain     Family history of colon cancer     Acute lumbar radiculopathy 01/02/2024    Acute sinusitis 01/02/2024    Acute upper respiratory infection 01/02/2024    Arthritis, degenerative, localized, primary, hand 01/02/2024    BPH (benign prostatic hyperplasia) 01/02/2024    Benign essential HTN 01/02/2024    Bradycardia 01/02/2024    Hyperlipidemia 01/02/2024    Dyslipidemia 01/02/2024    Eczema 01/02/2024    Elevated serum creatinine 01/02/2024    Hearing loss 01/02/2024    Iliac artery aneurysm (CMS-HCC) 01/02/2024    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    Rash 01/02/2024    Sinus node dysfunction (Multi) 01/02/2024    Stiffness of finger joint 01/02/2024    Hemangioma of skin and subcutaneous tissue 03/09/2023    Melanocytic nevi of scalp and neck 03/09/2023    Melanocytic nevi of trunk 03/09/2023    Melanocytic " nevi of unspecified lower limb, including hip 03/09/2023    Melanocytic nevi of unspecified upper limb, including shoulder 03/09/2023    Melanocytic nevi of other parts of face 03/09/2023    Neoplasm of uncertain behavior of skin 03/09/2023    Other melanin hyperpigmentation 03/09/2023    Personal history of other malignant neoplasm of skin 03/09/2023    Rosacea, unspecified 03/09/2023    Scar condition and fibrosis of skin 03/09/2023    Actinic keratosis 03/09/2023    Skin changes due to chronic exposure to nonionizing radiation, unspecified 03/09/2023       Past Medical History:  Past Medical History:   Diagnosis Date    BPH (benign prostatic hyperplasia)     Bradycardia     Follwed by Dr. Garcia, cardiology    Family history of colon cancer     Hyperlipidemia     Hypertension     Iliac artery aneurysm (CMS-HCC)     Not evident on follow up study - managed by cardiology    Low back pain     Personal history of colon polyps, unspecified     Personal history of other malignant neoplasm of skin 10/14/2015    History of squamous cell carcinoma of skin    Skin cancer     sqamous and melanoma - Dr. Vuong       Past Surgical History:  Past Surgical History:   Procedure Laterality Date    BOWEL RESECTION      small bowel obstruction    HERNIA REPAIR  11/11/2014    Inguinal Hernia Repair    TONSILLECTOMY  11/11/2014    Tonsillectomy         Social History:  Social History     Tobacco Use    Smoking status: Never    Smokeless tobacco: Never   Substance Use Topics    Alcohol use: Yes     Alcohol/week: 12.0 standard drinks of alcohol     Types: 12 Glasses of wine per week     Comment: 2 glasses of wine with dinner    Drug use: Yes     Types: Marijuana         Family History:  Family History   Problem Relation Name Age of Onset    Dementia Mother          vascular    Colon cancer Father          complications of colectomy    Heart attack Father      Breast cancer Sister          BRCA1    No Known Problems Daughter           "Pediatrician at [facility]    No Known Problems Son      No Known Problems Son      No Known Problems Grandson 3         [place]    No Known Problems Granddaughter 1         [place]        Allergies:  Patient has no known allergies.    Outpatient Medications:    Current Outpatient Medications:     atorvastatin (Lipitor) 40 mg tablet, TAKE 1 TABLET BY MOUTH IN THE  EVENING, Disp: 100 tablet, Rfl: 2    lisinopriL-hydrochlorothiazide 20-25 mg tablet, Take 1 tablet by mouth once daily., Disp: 90 tablet, Rfl: 3    meloxicam (Mobic) 15 mg tablet, Take 1 tablet (15 mg) by mouth once daily. TAKE WITH FOOD, Disp: 90 tablet, Rfl: 3    saw palmetto 450 mg capsule, Take 1 capsule (450 mg) by mouth once daily., Disp: , Rfl:     tamsulosin (Flomax) 0.4 mg 24 hr capsule, Take 1 capsule (0.4 mg) by mouth once daily at bedtime. AT BEDTIME, Disp: 90 capsule, Rfl: 3    triamcinolone (Kenalog) 0.1 % cream, Apply 1 Application topically 2 times a day., Disp: , Rfl:     Review of Systems:  Pertinent positives in review of systems outlined above.  Complete ROS otherwise negative.        Last Recorded Vitals:  Vitals:    10/09/24 1027 10/09/24 1123   BP: 128/83 134/74   BP Location: Right arm    Patient Position: Sitting    BP Cuff Size: Adult    Pulse: 55    Temp: 36.7 °C (98.1 °F)    SpO2: 97%    Weight: 77.7 kg (171 lb 6.4 oz)    Height: 1.778 m (5' 10\")    Body mass index is 24.59 kg/m².        Physical Exam  HENT:      Mouth/Throat:      Pharynx: Oropharynx is clear.   Eyes:      Extraocular Movements: Extraocular movements intact.      Conjunctiva/sclera: Conjunctivae normal.      Pupils: Pupils are equal, round, and reactive to light.   Cardiovascular:      Rate and Rhythm: Normal rate and regular rhythm.      Pulses: Normal pulses.      Heart sounds: Normal heart sounds.   Pulmonary:      Effort: Pulmonary effort is normal.   Musculoskeletal:      Right lower leg: No edema.      Left lower leg: No edema.   Neurological:      General: No " focal deficit present.         The 10-year ASCVD risk score (Tay LAO, et al., 2019) is: 29.4%    Values used to calculate the score:      Age: 79 years      Sex: Male      Is Non- : No      Diabetic: No      Tobacco smoker: No      Systolic Blood Pressure: 134 mmHg      Is BP treated: Yes      HDL Cholesterol: 67 mg/dL      Total Cholesterol: 148 mg/dL      Assessment/Plan   Problem List Items Addressed This Visit       Benign essential HTN     BP in a good range.  Continue current medications.          Relevant Orders    Urinalysis with Reflex Microscopic    Routine health maintenance - Primary     BP in a good range. Will check FBS and lipids.  PSA UTD, followed by urology.  Colonoscopy UTD. Eye exam UTD.  Will give PCV20 today.  Other vaccines UTD.          Vitamin D deficiency     Will include 25OHD level with next labs.           Relevant Orders    Vitamin D 25-Hydroxy,Total (for eval of Vitamin D levels)    Elevated glucose    Relevant Orders    Comprehensive Metabolic Panel    Hemoglobin A1C    Insomnia     Sleep quality poor.   Discussed strategies to improve sleep.   Will consider magnesium supplement with theanine          A total of 60 min was spent reviewing the chart and recent testing and discussing plan of care.     Timo Acevedo MD

## 2024-10-10 PROBLEM — Z00.00 ROUTINE HEALTH MAINTENANCE: Status: ACTIVE | Noted: 2024-10-10

## 2024-10-10 PROBLEM — G47.00 INSOMNIA: Status: ACTIVE | Noted: 2024-10-10

## 2024-10-10 PROBLEM — R73.09 ELEVATED GLUCOSE: Status: ACTIVE | Noted: 2024-10-10

## 2024-10-10 PROBLEM — E55.9 VITAMIN D DEFICIENCY: Status: ACTIVE | Noted: 2024-10-10

## 2024-10-11 NOTE — ASSESSMENT & PLAN NOTE
BP in a good range. Will check FBS and lipids.  PSA UTD, followed by urology.  Colonoscopy UTD. Eye exam UTD.  Will give PCV20 today.  Other vaccines UTD.

## 2024-10-11 NOTE — ASSESSMENT & PLAN NOTE
Sleep quality poor.   Discussed strategies to improve sleep.   Will consider magnesium supplement with theanine

## 2024-10-14 ENCOUNTER — APPOINTMENT (OUTPATIENT)
Dept: DERMATOLOGY | Facility: CLINIC | Age: 79
End: 2024-10-14
Payer: MEDICARE

## 2024-10-14 DIAGNOSIS — Z85.820 PERSONAL HISTORY OF MALIGNANT MELANOMA OF SKIN: ICD-10-CM

## 2024-10-14 DIAGNOSIS — L57.8 DIFFUSE PHOTODAMAGE OF SKIN: ICD-10-CM

## 2024-10-14 DIAGNOSIS — L57.0 ACTINIC KERATOSIS: ICD-10-CM

## 2024-10-14 DIAGNOSIS — L82.1 SEBORRHEIC KERATOSIS: ICD-10-CM

## 2024-10-14 DIAGNOSIS — D48.5 NEOPLASM OF UNCERTAIN BEHAVIOR OF SKIN: ICD-10-CM

## 2024-10-14 DIAGNOSIS — L81.4 LENTIGO: ICD-10-CM

## 2024-10-14 DIAGNOSIS — D22.5 MELANOCYTIC NEVUS OF TRUNK: Primary | ICD-10-CM

## 2024-10-14 DIAGNOSIS — L30.9 DERMATITIS: ICD-10-CM

## 2024-10-14 PROCEDURE — 11301 SHAVE SKIN LESION 0.6-1.0 CM: CPT | Performed by: DERMATOLOGY

## 2024-10-14 PROCEDURE — 1159F MED LIST DOCD IN RCRD: CPT | Performed by: DERMATOLOGY

## 2024-10-14 PROCEDURE — 99214 OFFICE O/P EST MOD 30 MIN: CPT | Performed by: DERMATOLOGY

## 2024-10-14 PROCEDURE — 11307 SHAVE SKIN LESION 1.1-2.0 CM: CPT | Performed by: DERMATOLOGY

## 2024-10-14 PROCEDURE — 17004 DESTROY PREMAL LESIONS 15/>: CPT | Performed by: DERMATOLOGY

## 2024-10-14 NOTE — PROGRESS NOTES
"Rosie Nunez J Call \"Marcin\" is a 79 y.o. male who presents for the following: Skin Check.  He notes a red, scaly bump on his right leg, which has been present for several months and has increased in size and does not heal.  He also notes a brown spot on his right hand, which has gotten darker recently.  Lastly, he notes a flare of red, scaly, itchy areas on his lower back.  He denies any other new, changing, or concerning skin lesions since his last visit; no bleeding, itching, or burning lesions.      Review of Systems:  No other skin or systemic complaints other than what is documented elsewhere in the note.    The following portions of the chart were reviewed this encounter and updated as appropriate:       Skin Cancer History  No skin cancer on file.    Specialty Problems          Dermatology Problems    Actinic keratosis    Hemangioma of skin and subcutaneous tissue    Melanocytic nevi of other parts of face    Melanocytic nevi of scalp and neck    Melanocytic nevi of trunk    Melanocytic nevi of unspecified lower limb, including hip    Melanocytic nevi of unspecified upper limb, including shoulder    Neoplasm of uncertain behavior of skin    Other melanin hyperpigmentation    Personal history of other malignant neoplasm of skin    Rosacea, unspecified    Scar condition and fibrosis of skin    Skin changes due to chronic exposure to nonionizing radiation, unspecified    Eczema    Rash       Past Dermatologic / Past Relevant Medical History:    - history of atypical melanocytic hyperplasia, for which early melanoma in situ, lentigo maligna type, 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 King on 4/30/24  - AKs  - mildly dysplastic junctional nevus on mid lower back on 1/25/18  - reported history of basal cell carcinoma with squamous features on left ear superior helix s/p Mohs surgery by Dr. Robb Barry in July " 2007 and then by Dr. 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    Current Medications / CAM's:    Current Outpatient Medications:     atorvastatin (Lipitor) 40 mg tablet, TAKE 1 TABLET BY MOUTH IN THE  EVENING, Disp: 100 tablet, Rfl: 2    lisinopriL-hydrochlorothiazide 20-25 mg tablet, Take 1 tablet by mouth once daily., Disp: 90 tablet, Rfl: 3    meloxicam (Mobic) 15 mg tablet, Take 1 tablet (15 mg) by mouth once daily. TAKE WITH FOOD, Disp: 90 tablet, Rfl: 3    saw palmetto 450 mg capsule, Take 1 capsule (450 mg) by mouth once daily., Disp: , Rfl:     tamsulosin (Flomax) 0.4 mg 24 hr capsule, Take 1 capsule (0.4 mg) by mouth once daily at bedtime. AT BEDTIME, Disp: 90 capsule, Rfl: 3    triamcinolone (Kenalog) 0.1 % cream, Apply 1 Application topically 2 times a day., Disp: , Rfl:      Objective   Well appearing patient in no apparent distress; mood and affect are within normal limits.    A full examination was performed including scalp, face, eyes, ears, nose, lips, neck, chest, axillae, abdomen, back, bilateral upper extremities, and bilateral lower extremities. All findings within normal limits unless otherwise noted below.    Assessment/Plan   1. Melanocytic nevus of trunk  Scattered on the patient's face, neck, trunk, and extremities, there are several small, round- to oval-shaped, brown-pigmented and pink-colored, symmetric, uniform-appearing macules and dome-shaped papules    Clinically benign- to slightly atypical-appearing nevi -  the clinically benign- to slightly atypical-appearing nature of the patient's nevi was discussed with the patient today.  None of the patient's nevi meet threshold for biopsy today.  We emphasized the importance of performing monthly self-skin exams using the ABCDs of monitoring moles, which were reviewed with the patient today, as well as the importance of sun avoidance and sun protection with daily sunscreen use.  The patient expressed understanding and is in agreement with this plan.    2. Neoplasm of uncertain behavior of skin (2)  Right Lower Leg - Anterior  7 mm pink, scaly papule           Shave removal    Lesion length (cm):  0.7  Margin per side (cm):  0  Lesion diameter (cm):  0.7  Informed consent: discussed and consent obtained    Timeout: patient name, date of birth, surgical site, and procedure verified    Procedure prep:  Patient was prepped and draped  Anesthesia: the lesion was anesthetized in a standard fashion    Anesthetic:  1% lidocaine w/ epinephrine 1-100,000 local infiltration  Instrument used: flexible razor blade    Hemostasis achieved with: aluminum chloride    Outcome: patient tolerated procedure well    Post-procedure details: sterile dressing applied and wound care instructions given    Dressing type: bandage and petrolatum      Staff Communication: Dermatology Local Anesthesia: 1 % Lidocaine / Epinephrine - Amount:    Specimen 1 - Dermatopathology- DERM LAB  Differential Diagnosis: r/o NMSC v AK  Check Margins Yes/No?:    Comments:    Dermpath Lab: Routine Histopathology (formalin-fixed tissue)    Right Dorsal Hand  1.2 cm dark brown pigmented, asymmetric patch with an asymmetric pigment network and irregular borders           Shave removal    Lesion length (cm):  1.2  Margin per side (cm):  0  Lesion diameter (cm):  1.2  Informed consent: discussed and consent obtained    Timeout: patient name, date of birth, surgical site, and procedure verified    Procedure prep:  Patient was  prepped and draped  Anesthesia: the lesion was anesthetized in a standard fashion    Anesthetic:  1% lidocaine w/ epinephrine 1-100,000 local infiltration  Instrument used: flexible razor blade    Hemostasis achieved with: aluminum chloride    Outcome: patient tolerated procedure well    Post-procedure details: sterile dressing applied and wound care instructions given    Dressing type: bandage and petrolatum      Staff Communication: Dermatology Local Anesthesia: 1 % Lidocaine / Epinephrine - Amount:    Specimen 2 - Dermatopathology- DERM LAB  Differential Diagnosis: r/o lentigo v AMH  Check Margins Yes/No?:    Comments:    Dermpath Lab: Routine Histopathology (formalin-fixed tissue)        Related Procedures  Follow Up In Dermatology    3. Actinic keratosis (16)  Head - Anterior (Face) (16)  Scattered on the patient's face and bilateral ears, there are multiple erythematous, gritty, scaly macules     Actinic Keratoses -scattered on face and bilateral ears.  The pre-cancerous nature of these lesions and treatment options were discussed with the patient today.  At this time, we recommend treatment with liquid nitrogen cryotherapy.  The patient expressed understanding, is in agreement with this plan, and wishes to proceed with cryotherapy today.    Destr of lesion - Head - Anterior (Face) (16)  Complexity: simple    Destruction method: cryotherapy    Informed consent: discussed and consent obtained    Lesion destroyed using liquid nitrogen: Yes    Cryotherapy cycles:  1  Outcome: patient tolerated procedure well with no complications    Post-procedure details: wound care instructions given      4. Seborrheic keratosis  Scattered on the patient's face, neck, trunk, and extremities, there are multiple tan- to light brown-colored, hyperkeratotic, stuck-on appearing papules of varying size and shape    Seborrheic Keratoses - the benign nature of these lesions was discussed with the patient today and reassurance provided.   No treatment is medically indicated for these lesions at this time.    5. Lentigo  Photodistributed  Multiple tan- to light brown-colored, round- to oval-shaped, symmetric and uniform-appearing macules and small patches consistent with lentigines scattered in sun-exposed areas.    Solar Lentigines and photodamage.  The clinically benign-appearing nature of these lesions and their relation to chronic sun exposure were discussed with the patient today and reassurance provided.  None of these lesions, with the exception of the one noted above, meet threshold for biopsy today, and thus no treatment is medically indicated for these lesions at this time.  The signs and symptoms of skin cancer were reviewed and the patient was advised to practice sun protection and sun avoidance, use daily sunscreen, and perform regular self skin exams.  The patient was instructed to monitor these lesions for any changes, such as in size, shape, or color, or associated symptoms and to call our office to schedule a return visit for re-evaluation if any such changes or symptoms are noticed in the future.  The patient expressed understanding and is in agreement with this plan.    6. Dermatitis  Right Flank  On the patient's lower back, there are a few ill-defined erythematous, scaly, slightly lichenified, thin plaques    Eczema - flare on lower back.  The potentially chronic and intermittently flaring nature of this condition and treatment options were discussed extensively with the patient today.  At this time, we recommend topical steroid therapy with Triamcinolone 0.1% cream, which the patient was instructed to apply twice daily to the affected areas of his lower back (avoid face, groin, body folds) for the next 2 weeks, followed by taper to twice daily on weekends only for persistent areas; the patient may repeat treatment in a 2-week burst-and-taper fashion every 6-8 weeks as needed for future flares.  We also emphasized the importance  of dry, sensitive skin care, including the use of a mild soap, such as Dove, and frequent and aggressive moisturization, at least twice daily and immediately following showers or baths, with recommended over-the-counter moisturizing creams, such as Eucerin, Cetaphil, Cerave, or Aveeno, or Vaseline or Aquaphor ointments.  The risks, benefits, and side effects of this medication, including possible skin atrophy with overuse of topical steroids, were discussed.  The patient expressed understanding and is in agreement with this plan.    7. Personal history of malignant melanoma of skin  The sites of prior melanoma excision were examined.  There is no evidence of recurrent growth or pigmentation or recurrent disease, satellite papules, or nodules on exam today.    History of melanoma in situ, lentigo maligna type, basal cell carcinoma, dysplastic nevus, and actinic keratoses and photodamage.  There is no evidence of local, regional, or distant recurrent disease or any new primary melanomas on exam today.  We emphasized the importance of continuing to perform monthly self-skin and lymph node exams using the ABCDs of monitoring moles, which were reviewed with the patient, as well as the importance of sun avoidance and sun protection with daily sunscreen use.  We will have the patient return to our office in 4 to 6 months, pending the above biopsy results, for routine melanoma follow-up and total body skin exam, and the patient was instructed to call our office should the patient notice any new, changing, symptomatic, or otherwise concerning skin lesions before then.  The patient expressed understanding and is in agreement with this plan.    8. Diffuse photodamage of skin  Photodistributed  Diffuse photodamage with actinic changes with telangiectasia and mottled pigmentation in sun-exposed areas.    Photodamage.  The signs and symptoms of skin cancer were reviewed and the patient was advised to practice sun protection and  sun avoidance, use daily sunscreen, and perform regular self skin exams.  Sun protection was discussed, including avoiding the mid-day sun, wearing a sunscreen with SPF at least 50, and stressing the need for reapplication of sunscreen and applying more than they think they need.        Jono Cardozo MD  PGY4 Dermatology      I saw and evaluated the patient. I personally obtained the key and critical portions of the history and physical exam or was physically present for key and critical portions performed by the resident/fellow. I reviewed the resident/fellow's documentation and discussed the patient with the resident/fellow. I agree with the resident/fellow's medical decision making as documented in the note.    Brain Vuong MD

## 2024-10-15 ENCOUNTER — LAB (OUTPATIENT)
Dept: LAB | Facility: LAB | Age: 79
End: 2024-10-15
Payer: MEDICARE

## 2024-10-15 DIAGNOSIS — E55.9 VITAMIN D DEFICIENCY: ICD-10-CM

## 2024-10-15 DIAGNOSIS — R73.09 ELEVATED GLUCOSE: ICD-10-CM

## 2024-10-15 DIAGNOSIS — I10 BENIGN ESSENTIAL HTN: ICD-10-CM

## 2024-10-15 LAB
25(OH)D3 SERPL-MCNC: 34 NG/ML (ref 30–100)
ALBUMIN SERPL BCP-MCNC: 4.4 G/DL (ref 3.4–5)
ALP SERPL-CCNC: 68 U/L (ref 33–136)
ALT SERPL W P-5'-P-CCNC: 21 U/L (ref 10–52)
ANION GAP SERPL CALC-SCNC: 12 MMOL/L (ref 10–20)
APPEARANCE UR: CLEAR
AST SERPL W P-5'-P-CCNC: 19 U/L (ref 9–39)
BILIRUB SERPL-MCNC: 0.7 MG/DL (ref 0–1.2)
BILIRUB UR STRIP.AUTO-MCNC: NEGATIVE MG/DL
BUN SERPL-MCNC: 24 MG/DL (ref 6–23)
CALCIUM SERPL-MCNC: 9.5 MG/DL (ref 8.6–10.6)
CHLORIDE SERPL-SCNC: 104 MMOL/L (ref 98–107)
CO2 SERPL-SCNC: 28 MMOL/L (ref 21–32)
COLOR UR: NORMAL
CREAT SERPL-MCNC: 1.13 MG/DL (ref 0.5–1.3)
EGFRCR SERPLBLD CKD-EPI 2021: 67 ML/MIN/1.73M*2
EST. AVERAGE GLUCOSE BLD GHB EST-MCNC: 105 MG/DL
GLUCOSE SERPL-MCNC: 101 MG/DL (ref 74–99)
GLUCOSE UR STRIP.AUTO-MCNC: NORMAL MG/DL
HBA1C MFR BLD: 5.3 %
KETONES UR STRIP.AUTO-MCNC: NEGATIVE MG/DL
LEUKOCYTE ESTERASE UR QL STRIP.AUTO: NEGATIVE
NITRITE UR QL STRIP.AUTO: NEGATIVE
PH UR STRIP.AUTO: 7 [PH]
POTASSIUM SERPL-SCNC: 4.3 MMOL/L (ref 3.5–5.3)
PROT SERPL-MCNC: 6.3 G/DL (ref 6.4–8.2)
PROT UR STRIP.AUTO-MCNC: NEGATIVE MG/DL
RBC # UR STRIP.AUTO: NEGATIVE /UL
SODIUM SERPL-SCNC: 140 MMOL/L (ref 136–145)
SP GR UR STRIP.AUTO: 1.01
UROBILINOGEN UR STRIP.AUTO-MCNC: NORMAL MG/DL

## 2024-10-15 PROCEDURE — 81003 URINALYSIS AUTO W/O SCOPE: CPT

## 2024-10-15 PROCEDURE — 82306 VITAMIN D 25 HYDROXY: CPT

## 2024-10-15 PROCEDURE — 36415 COLL VENOUS BLD VENIPUNCTURE: CPT

## 2024-10-15 PROCEDURE — 80053 COMPREHEN METABOLIC PANEL: CPT

## 2024-10-15 PROCEDURE — 83036 HEMOGLOBIN GLYCOSYLATED A1C: CPT

## 2024-10-16 LAB
LABORATORY COMMENT REPORT: NORMAL
PATH REPORT.FINAL DX SPEC: NORMAL
PATH REPORT.GROSS SPEC: NORMAL
PATH REPORT.MICROSCOPIC SPEC OTHER STN: NORMAL
PATH REPORT.RELEVANT HX SPEC: NORMAL
PATH REPORT.TOTAL CANCER: NORMAL

## 2024-12-19 ENCOUNTER — APPOINTMENT (OUTPATIENT)
Dept: PRIMARY CARE | Facility: CLINIC | Age: 79
End: 2024-12-19
Payer: MEDICARE

## 2024-12-23 ENCOUNTER — APPOINTMENT (OUTPATIENT)
Dept: PRIMARY CARE | Facility: CLINIC | Age: 79
End: 2024-12-23
Payer: MEDICARE

## 2024-12-23 VITALS
OXYGEN SATURATION: 97 % | SYSTOLIC BLOOD PRESSURE: 119 MMHG | BODY MASS INDEX: 24.2 KG/M2 | DIASTOLIC BLOOD PRESSURE: 73 MMHG | HEIGHT: 70 IN | HEART RATE: 58 BPM | WEIGHT: 169 LBS

## 2024-12-23 DIAGNOSIS — E78.5 DYSLIPIDEMIA: ICD-10-CM

## 2024-12-23 DIAGNOSIS — I10 BENIGN ESSENTIAL HTN: ICD-10-CM

## 2024-12-23 DIAGNOSIS — M54.50 MIDLINE LOW BACK PAIN WITHOUT SCIATICA, UNSPECIFIED CHRONICITY: Primary | ICD-10-CM

## 2024-12-23 PROCEDURE — G2211 COMPLEX E/M VISIT ADD ON: HCPCS | Performed by: INTERNAL MEDICINE

## 2024-12-23 PROCEDURE — 3074F SYST BP LT 130 MM HG: CPT | Performed by: INTERNAL MEDICINE

## 2024-12-23 PROCEDURE — 1159F MED LIST DOCD IN RCRD: CPT | Performed by: INTERNAL MEDICINE

## 2024-12-23 PROCEDURE — 3078F DIAST BP <80 MM HG: CPT | Performed by: INTERNAL MEDICINE

## 2024-12-23 PROCEDURE — 99214 OFFICE O/P EST MOD 30 MIN: CPT | Performed by: INTERNAL MEDICINE

## 2024-12-23 RX ORDER — LISINOPRIL AND HYDROCHLOROTHIAZIDE 20; 25 MG/1; MG/1
1 TABLET ORAL DAILY
Qty: 90 TABLET | Refills: 0 | Status: SHIPPED | OUTPATIENT
Start: 2024-12-23

## 2024-12-23 RX ORDER — ATORVASTATIN CALCIUM 40 MG/1
40 TABLET, FILM COATED ORAL NIGHTLY
Qty: 100 TABLET | Refills: 0 | Status: SHIPPED | OUTPATIENT
Start: 2024-12-23

## 2024-12-23 NOTE — PROGRESS NOTES
"Subjective   Patient ID: Marcin Myers is a 79 y.o. male who presents for Establish Care.    HPI the patient for first time septated Franchi no chest pain no shortness of breath has been using meloxicam on a daily basis for some lower back discomfort had some recent blood work this was reviewed heart rates have been low follows up with cardiology bowels normal no dysuria    Past medical history hypertension hyperlipidemia low back pain bradycardia    Medications noted and unchanged see above    Allergies no known drug allergies    Social history no tobacco lives in [place] during the summertime    Prevention he swims for exercise is hikes and kayaks in the summertime some prior blood work reviewed vital signs noted alert and oriented x 3 NCAT no JVD or bruit chest clear to auscultation cor regular rate and rhythm S1-S2    Review of Systems    Objective   /78   Pulse 58   Ht 1.778 m (5' 10\")   SpO2 97%   BMI 24.59 kg/m²     Physical Exam without murmur gallop or rub or skip extremities no clubbing cyanosis or edema normal distal pulses LS spine normal to straighten curvature negative straight leg raise DTR 2+ and brisk    Assessment/Plan    impression hypertension hyperlipidemia other diagnoses low back pain  Plan advised on trying to use the meloxicam on not an every day basis take with food when using continue with blood pressure combination medication avoidance of other NSAIDs Tylenol back hygiene back stretches heating pad continue with statin medication recent blood work reviewed and then will recheck for regular physical examination other blood work and screening in February or March 2025 before leaving for [place] again  [id number] cc21  Screening check on this (check)       "

## 2025-01-08 ENCOUNTER — HOSPITAL ENCOUNTER (OUTPATIENT)
Dept: VASCULAR MEDICINE | Facility: HOSPITAL | Age: 80
Discharge: HOME | End: 2025-01-08
Payer: MEDICARE

## 2025-01-08 ENCOUNTER — OFFICE VISIT (OUTPATIENT)
Dept: CARDIOLOGY | Facility: HOSPITAL | Age: 80
End: 2025-01-08
Payer: MEDICARE

## 2025-01-08 VITALS
BODY MASS INDEX: 23.62 KG/M2 | HEIGHT: 70 IN | HEART RATE: 50 BPM | RESPIRATION RATE: 17 BRPM | SYSTOLIC BLOOD PRESSURE: 114 MMHG | DIASTOLIC BLOOD PRESSURE: 62 MMHG | OXYGEN SATURATION: 95 % | WEIGHT: 165 LBS

## 2025-01-08 DIAGNOSIS — R00.1 BRADYCARDIA: ICD-10-CM

## 2025-01-08 DIAGNOSIS — E78.5 DYSLIPIDEMIA: ICD-10-CM

## 2025-01-08 DIAGNOSIS — I10 BENIGN ESSENTIAL HTN: Primary | ICD-10-CM

## 2025-01-08 DIAGNOSIS — I72.3 ILIAC ARTERY ANEURYSM (CMS-HCC): ICD-10-CM

## 2025-01-08 LAB
ATRIAL RATE: 50 BPM
P AXIS: 34 DEGREES
P OFFSET: 164 MS
P ONSET: 97 MS
PR INTERVAL: 244 MS
Q ONSET: 219 MS
QRS COUNT: 8 BEATS
QRS DURATION: 82 MS
QT INTERVAL: 430 MS
QTC CALCULATION(BAZETT): 392 MS
QTC FREDERICIA: 404 MS
R AXIS: 20 DEGREES
T AXIS: 24 DEGREES
T OFFSET: 434 MS
VENTRICULAR RATE: 50 BPM

## 2025-01-08 PROCEDURE — 93005 ELECTROCARDIOGRAM TRACING: CPT | Performed by: NURSE PRACTITIONER

## 2025-01-08 PROCEDURE — 1036F TOBACCO NON-USER: CPT | Performed by: NURSE PRACTITIONER

## 2025-01-08 PROCEDURE — 93978 VASCULAR STUDY: CPT

## 2025-01-08 PROCEDURE — 99214 OFFICE O/P EST MOD 30 MIN: CPT | Performed by: NURSE PRACTITIONER

## 2025-01-08 PROCEDURE — 3074F SYST BP LT 130 MM HG: CPT | Performed by: NURSE PRACTITIONER

## 2025-01-08 PROCEDURE — 93978 VASCULAR STUDY: CPT | Performed by: SURGERY

## 2025-01-08 PROCEDURE — 3078F DIAST BP <80 MM HG: CPT | Performed by: NURSE PRACTITIONER

## 2025-01-08 PROCEDURE — G2211 COMPLEX E/M VISIT ADD ON: HCPCS | Performed by: NURSE PRACTITIONER

## 2025-01-08 PROCEDURE — 1159F MED LIST DOCD IN RCRD: CPT | Performed by: NURSE PRACTITIONER

## 2025-01-08 NOTE — PROGRESS NOTES
Primary Care Physician: Beau Brito MD  Date of Visit: 01/08/2025 10:30 AM EST  Location of visit: [facility]     Chief Complaint:   Chief Complaint   Patient presents with    Follow-up        HPI / Summary:   Enrique Myers is a 79 y.o. male presents for followup. Seen in collaboration with Dr. Garcia. He has no cardiac complaints. He is physically active and swims up to 0.75 mile 3 days a week and 1 mile once a month without chest pain or dyspnea. He has mild dyspnea after walking up 3 flights of stairs The patient denies chest pain, shortness of breath, palpitations, lightheadedness, syncope, orthopnea, paroxysmal nocturnal dyspnea, lower extremity edema, or bleeding problems.              Past Medical History:  Past Medical History:   Diagnosis Date    BPH (benign prostatic hyperplasia)     Bradycardia     Follwed by Dr. Garcia, cardiology    Family history of colon cancer     Hyperlipidemia     Hypertension     Iliac artery aneurysm (CMS-HCC)     Not evident on follow up study - managed by cardiology    Low back pain     Personal history of colon polyps, unspecified     Personal history of other malignant neoplasm of skin 10/14/2015    History of squamous cell carcinoma of skin    Skin cancer     sqamous and melanoma - Dr. Vuong        Past Surgical History:  Past Surgical History:   Procedure Laterality Date    BOWEL RESECTION      small bowel obstruction    HERNIA REPAIR  11/11/2014    Inguinal Hernia Repair    TONSILLECTOMY  11/11/2014    Tonsillectomy          Social History:   reports that he has never smoked. He has never used smokeless tobacco. He reports current alcohol use of about 12.0 standard drinks of alcohol per week. He reports current drug use. Drug: Marijuana.     Family History:  family history includes Breast cancer in his sister; Colon cancer in his father; Dementia in his mother; Heart attack in his father; No Known Problems in his daughter, granddaughter, grandson, son, and son.  "     Allergies:  No Known Allergies    Outpatient Medications:  Current Outpatient Medications   Medication Instructions    atorvastatin (LIPITOR) 40 mg, oral, Nightly    lisinopriL-hydrochlorothiazide 20-25 mg tablet 1 tablet, oral, Daily    meloxicam (MOBIC) 15 mg, oral, Daily, TAKE WITH FOOD    saw palmetto 450 mg capsule 1 capsule, Daily    tamsulosin (FLOMAX) 0.4 mg, oral, Nightly, AT BEDTIME    triamcinolone (Kenalog) 0.1 % cream 1 Application, 2 times daily       Physical Exam:  Vitals:    01/08/25 1046   BP: 136/83   BP Location: Left arm   Patient Position: Sitting   Pulse: 50   Resp: 17   SpO2: 95%   Weight: 74.8 kg (165 lb)   Height: 1.778 m (5' 10\")     Wt Readings from Last 5 Encounters:   01/08/25 74.8 kg (165 lb)   12/23/24 76.7 kg (169 lb)   10/09/24 77.7 kg (171 lb 6.4 oz)   03/19/24 76.7 kg (169 lb)   01/03/24 77.6 kg (171 lb)     Body mass index is 23.68 kg/m².   GENERAL: alert, cooperative, pleasant, in no acute distress  SKIN: warm and dry  NECK: Normal JVD, negative HJR  CARDIAC: Regular rate and rhythm with no rubs, murmurs, or gallops  CHEST: Normal respiratory efforts, lungs clear to auscultation bilaterally.  ABDOMEN: soft, nontender, nondistended  EXTREMITIES: no edema, +2 palpable RP bilaterally       Last Labs:  Recent Labs     01/08/24  0855 01/19/23  1019 01/12/22  1006   WBC 5.3 4.6 4.9   HGB 13.8 13.6 14.8   HCT 41.5 39.5* 42.4    172 195   MCV 91 87 89     Recent Labs     10/15/24  0752 01/08/24  0855 01/19/23  1019    137 137   K 4.3 4.1 4.0    101 100   BUN 24* 25* 19   CREATININE 1.13 1.12 1.03     CMP -  Lab Results   Component Value Date    CALCIUM 9.5 10/15/2024    PROT 6.3 (L) 10/15/2024    ALBUMIN 4.4 10/15/2024    AST 19 10/15/2024    ALT 21 10/15/2024    ALKPHOS 68 10/15/2024    BILITOT 0.7 10/15/2024       LIPID PANEL -   Lab Results   Component Value Date    CHOL 148 01/08/2024    HDL 67.0 01/08/2024    LDLF 59 01/19/2023    TRIG 65 01/08/2024   LDL 68 " 1/8/24    Lab Results   Component Value Date    HGBA1C 5.3 10/15/2024    HGBA1C 5.4 12/09/2019       Last Cardiology Tests:  ECG:  Obtained and reviewed EKG- sinus bradycardia HR 50 with first degree AV block     Echo:  Echo Results:  No results found for this or any previous visit from the past 365 days.         Cath:      Stress Test:  Stress Results:  No results found for this or any previous visit from the past 365 days.     Cardiac Imaging:  Abdominal aortic ultrasound today preliminary results showed no iliac artery aneurysm, only tortuous.     Abdominal ultrasound December 18, 2017  IMPRESSION:  1. Stable left common iliac aneurysm measuring 1.6 cm.  2. No aneurysm involving the abdominal aorta or right common iliac  artery.        Assessment/Plan   Problem List Items Addressed This Visit          Cardiac and Vasculature    Benign essential HTN - Primary    Relevant Orders    ECG 12 lead (Clinic Performed)    Bradycardia    Dyslipidemia    Relevant Orders    Lipid panel       In summary Mr. Myers is a pleasant 79 year-old white male with a past medical history significant for hypertension, hyperlipidemia, and asymptomatic sinus bradycardia.  He is asymptomatic from a cardiac perspective.  He is physically active without symptoms.  His blood pressure is acceptable. I have ordered fasting lipid panel. His abdominal aortic ultrasound today did not show evidence of left iliac artery aneurysm. He should continue his current cardiovascular medications. We will see him back in follow-up in 1 year.       Orders:  No orders of the defined types were placed in this encounter.     Followup Appts:  Future Appointments   Date Time Provider Department Center   3/10/2025 10:30 AM Beau Brito MD [id number] [facility]   4/28/2025 10:30 AM Brain Vuong MD [id number] [facility]           ____________________________________________________________  Shahnaz Braun, APRN-CNP  [facility]  [place]  [facility] [place]

## 2025-01-08 NOTE — PATIENT INSTRUCTIONS
Continue current cardiovascular medications  Follow up in 1 year  Continue physical activity  Check fasting lipid panel   Follow up with primary care physician in the interim

## 2025-01-14 ENCOUNTER — LAB (OUTPATIENT)
Dept: LAB | Facility: LAB | Age: 80
End: 2025-01-14
Payer: MEDICARE

## 2025-01-14 DIAGNOSIS — E78.5 DYSLIPIDEMIA: ICD-10-CM

## 2025-01-14 LAB
CHOLEST SERPL-MCNC: 149 MG/DL (ref 0–199)
CHOLESTEROL/HDL RATIO: 2.3
HDLC SERPL-MCNC: 66.1 MG/DL
LDLC SERPL CALC-MCNC: 71 MG/DL
NON HDL CHOLESTEROL: 83 MG/DL (ref 0–149)
TRIGL SERPL-MCNC: 58 MG/DL (ref 0–149)
VLDL: 12 MG/DL (ref 0–40)

## 2025-01-14 PROCEDURE — 80061 LIPID PANEL: CPT

## 2025-01-17 ENCOUNTER — TELEPHONE (OUTPATIENT)
Dept: DERMATOLOGY | Facility: CLINIC | Age: 80
End: 2025-01-17
Payer: MEDICARE

## 2025-01-17 NOTE — TELEPHONE ENCOUNTER
Pt left message that he has appt with Dr Vuong on 04/28/25 -- He has a lesion on his scalp he is concerned about and would like sooner appt if possible ..;)  Pt only wants appt for Dr Vuong to check the lesion wants to keep his April appt for his skin exam ..;))

## 2025-01-18 ENCOUNTER — OFFICE VISIT (OUTPATIENT)
Dept: DERMATOLOGY | Facility: CLINIC | Age: 80
End: 2025-01-18
Payer: MEDICARE

## 2025-03-10 ENCOUNTER — APPOINTMENT (OUTPATIENT)
Dept: PRIMARY CARE | Facility: CLINIC | Age: 80
End: 2025-03-10
Payer: MEDICARE

## 2025-03-10 VITALS
BODY MASS INDEX: 24.25 KG/M2 | OXYGEN SATURATION: 95 % | RESPIRATION RATE: 12 BRPM | SYSTOLIC BLOOD PRESSURE: 121 MMHG | WEIGHT: 169 LBS | DIASTOLIC BLOOD PRESSURE: 74 MMHG | HEART RATE: 50 BPM

## 2025-03-10 DIAGNOSIS — I10 BENIGN ESSENTIAL HTN: ICD-10-CM

## 2025-03-10 DIAGNOSIS — E78.5 DYSLIPIDEMIA: ICD-10-CM

## 2025-03-10 DIAGNOSIS — M54.50 MIDLINE LOW BACK PAIN WITHOUT SCIATICA, UNSPECIFIED CHRONICITY: ICD-10-CM

## 2025-03-10 DIAGNOSIS — N40.0 BENIGN PROSTATIC HYPERPLASIA, UNSPECIFIED WHETHER LOWER URINARY TRACT SYMPTOMS PRESENT: ICD-10-CM

## 2025-03-10 DIAGNOSIS — Z00.00 HEALTH CARE MAINTENANCE: Primary | ICD-10-CM

## 2025-03-10 PROCEDURE — 99214 OFFICE O/P EST MOD 30 MIN: CPT | Performed by: INTERNAL MEDICINE

## 2025-03-10 PROCEDURE — 3078F DIAST BP <80 MM HG: CPT | Performed by: INTERNAL MEDICINE

## 2025-03-10 PROCEDURE — G0439 PPPS, SUBSEQ VISIT: HCPCS | Performed by: INTERNAL MEDICINE

## 2025-03-10 PROCEDURE — 99397 PER PM REEVAL EST PAT 65+ YR: CPT | Performed by: INTERNAL MEDICINE

## 2025-03-10 PROCEDURE — 1160F RVW MEDS BY RX/DR IN RCRD: CPT | Performed by: INTERNAL MEDICINE

## 2025-03-10 PROCEDURE — 1159F MED LIST DOCD IN RCRD: CPT | Performed by: INTERNAL MEDICINE

## 2025-03-10 PROCEDURE — 1036F TOBACCO NON-USER: CPT | Performed by: INTERNAL MEDICINE

## 2025-03-10 PROCEDURE — 1170F FXNL STATUS ASSESSED: CPT | Performed by: INTERNAL MEDICINE

## 2025-03-10 PROCEDURE — 3074F SYST BP LT 130 MM HG: CPT | Performed by: INTERNAL MEDICINE

## 2025-03-10 NOTE — PROGRESS NOTES
Subjective   Patient ID: Marcin Myers is a 79 y.o. male who presents for Annual Exam.    HPI  CPE septated Franchi no chest pain no shortness of breath has been to [place] is going to [place] in April 2025 bowels normal has been using less meloxicam has inquired about increase in Flomax based on daytime hesitancy bones muscles joints okay except for neck sometimes stiff    Past medical history hypertension hyperlipidemia low back pain bradycardia osteoarthritis    Medications noted and unchanged see above meloxicam tamsulosin    Allergies no known drug allergies    Social history no tobacco lives in [place] during the summertime    Family history noted and unchanged    Prevention he swims for exercise is [employer] and [employer] in the summertime some prior blood work reviewed discussed with colonoscopy based on family history    Depression screen not depressed  Review of Systems    Objective   /74   Pulse (!) 48   SpO2 95%   Vital signs noted alert and oriented x 3 NCAT PERRLA EOMI nares without discharge OP benign TM normal bilateral EAC clear bilateral no AC nodes no JVD or bruits no thyromegaly chest clear to auscultation and percussion CV regular rate and rhythm   Physical Exam without murmur gallop or rub or skip abdomen soft nont nond nabs no rg no hsm extremities no clubbing cyanosis or edema normal distal pulses LS spine normal to straighten curvature negative straight leg raise DTR 2+ and brisk    Assessment/Plan    Impression general medical examination hypertension hyperlipidemia other diagnoses low back pain neck pain bph  Plan advised on trying to use the meloxicam on not an every day basis take with food when using continue with blood pressure combination medication avoidance of other NSAIDs Tylenol back hygiene back stretches the cervical spine has a normal range of motion some tightness near the left side ok for heating pad continue with statin medication recent blood work reviewed and then will  recheck for additional screening may increase the flomax to twice daily to keep help with the urine symptoms did not feel that he needed any additional blood work reviewed prior cardiac studies and former blood work and then recheck 3 months based on above TT 50 cc 26    Review blood work-normal (check)

## 2025-03-30 ASSESSMENT — ENCOUNTER SYMPTOMS
OCCASIONAL FEELINGS OF UNSTEADINESS: 0
LOSS OF SENSATION IN FEET: 0
DEPRESSION: 0

## 2025-03-30 ASSESSMENT — PATIENT HEALTH QUESTIONNAIRE - PHQ9
1. LITTLE INTEREST OR PLEASURE IN DOING THINGS: NOT AT ALL
SUM OF ALL RESPONSES TO PHQ9 QUESTIONS 1 AND 2: 0
2. FEELING DOWN, DEPRESSED OR HOPELESS: NOT AT ALL

## 2025-03-30 ASSESSMENT — ACTIVITIES OF DAILY LIVING (ADL)
GROCERY_SHOPPING: INDEPENDENT
BATHING: INDEPENDENT
MANAGING_FINANCES: INDEPENDENT
DRESSING: INDEPENDENT
TAKING_MEDICATION: INDEPENDENT
DOING_HOUSEWORK: INDEPENDENT

## 2025-04-07 ENCOUNTER — TELEPHONE (OUTPATIENT)
Dept: PRIMARY CARE | Facility: CLINIC | Age: 80
End: 2025-04-07

## 2025-04-08 DIAGNOSIS — Z00.00 HEALTH CARE MAINTENANCE: Primary | ICD-10-CM

## 2025-04-08 RX ORDER — AZITHROMYCIN 250 MG/1
TABLET, FILM COATED ORAL
Qty: 6 TABLET | Refills: 0 | Status: SHIPPED | OUTPATIENT
Start: 2025-04-08 | End: 2025-04-13

## 2025-04-28 ENCOUNTER — APPOINTMENT (OUTPATIENT)
Dept: DERMATOLOGY | Facility: CLINIC | Age: 80
End: 2025-04-28
Payer: MEDICARE

## 2025-04-28 DIAGNOSIS — D48.5 NEOPLASM OF UNCERTAIN BEHAVIOR OF SKIN: ICD-10-CM

## 2025-04-28 DIAGNOSIS — R21 RASH AND OTHER NONSPECIFIC SKIN ERUPTION: Primary | ICD-10-CM

## 2025-04-28 DIAGNOSIS — L82.1 SEBORRHEIC KERATOSIS: ICD-10-CM

## 2025-04-28 DIAGNOSIS — L57.8 DIFFUSE PHOTODAMAGE OF SKIN: ICD-10-CM

## 2025-04-28 DIAGNOSIS — D22.5 MELANOCYTIC NEVUS OF TRUNK: ICD-10-CM

## 2025-04-28 DIAGNOSIS — D18.01 HEMANGIOMA OF SKIN: ICD-10-CM

## 2025-04-28 DIAGNOSIS — Z85.820 PERSONAL HISTORY OF MALIGNANT MELANOMA OF SKIN: ICD-10-CM

## 2025-04-28 DIAGNOSIS — L57.0 ACTINIC KERATOSIS: ICD-10-CM

## 2025-04-28 PROCEDURE — 11102 TANGNTL BX SKIN SINGLE LES: CPT | Performed by: DERMATOLOGY

## 2025-04-28 PROCEDURE — 17004 DESTROY PREMAL LESIONS 15/>: CPT | Performed by: DERMATOLOGY

## 2025-04-28 PROCEDURE — 1159F MED LIST DOCD IN RCRD: CPT | Performed by: DERMATOLOGY

## 2025-04-28 PROCEDURE — 11301 SHAVE SKIN LESION 0.6-1.0 CM: CPT | Performed by: DERMATOLOGY

## 2025-04-28 PROCEDURE — 99214 OFFICE O/P EST MOD 30 MIN: CPT | Performed by: DERMATOLOGY

## 2025-04-28 RX ORDER — VALACYCLOVIR HYDROCHLORIDE 1 G/1
1000 TABLET, FILM COATED ORAL 3 TIMES DAILY
Qty: 21 TABLET | Refills: 0 | Status: SHIPPED | OUTPATIENT
Start: 2025-04-28 | End: 2025-05-05

## 2025-04-28 NOTE — Clinical Note
Erythematous plaques with vesicles - right buttock.  The clinical differential diagnosis for these lesions includes possibly herpes simplex virus (HSV) infection versus herpes zoster (shingles).  The viral nature of each of these conditions and management options, including possible biopsy for further diagnostic evaluation as well as viral culture, were discussed extensively with the patient today.  After discussing the risks and benefits of various management options, the patient expressed understanding and wishes to have a swab sent for viral culture for further diagnostic evaluation.  In the meantime, while we await virology results, I recommend empiric oral anti-viral therapy for possible shingles with a 7-day course of Valtrex 1 gram PO TID.  The risks, benefits, and side effects of this medication were discussed.  The patient expressed understanding and is in agreement with this plan.

## 2025-04-28 NOTE — Clinical Note
V[place]enor Angiomas - the benign nature of these vascular lesions was discussed with the patient today and reassurance provided.  No treatment is medically indicated for these lesions at this time.

## 2025-04-28 NOTE — Clinical Note
Biopsy-proven Actinic Keratosis and additional AKs -right medial distal leg, present on the deep and peripheral margins, and scattered on bilateral legs and face, respectively.  The pre-cancerous nature of these lesions and treatment options were discussed with the patient today.  At this time, I recommend treatment with liquid nitrogen cryotherapy.  The patient expressed understanding, is in agreement with this plan, and wishes to proceed with cryotherapy today.

## 2025-04-28 NOTE — Clinical Note
History of melanoma in situ, lentigo maligna type, basal cell carcinoma, dysplastic nevus, and actinic keratoses and photodamage.  There is no evidence of local, regional, or distant recurrent disease or any new primary melanomas on exam today.  I emphasized the importance of continuing to perform monthly self-skin and lymph node exams using the ABCDs of monitoring moles, which were reviewed with the patient, as well as the importance of sun avoidance and sun protection with daily sunscreen use.  I will have the patient return to our office in 4 to 6 months, pending the above biopsy results, for routine melanoma follow-up and total body skin exam, and the patient was instructed to call our office should the patient notice any new, changing, symptomatic, or otherwise concerning skin lesions before then.  The patient expressed understanding and is in agreement with this plan.

## 2025-04-28 NOTE — Clinical Note
On his right buttock, there are a few similar-appearing erythematous, edematous plaques with multiple vesicles within the plaques.  There are no other areas of involvement noted.

## 2025-04-28 NOTE — Clinical Note
The sites of prior melanoma excision were examined.  There is no evidence of recurrent growth or pigmentation or recurrent disease, satellite papules, or nodules on exam today.

## 2025-04-28 NOTE — Clinical Note
On the patient's right medial distal leg, there is a pink, well-healed scar at the recent biopsy site with a surrounding rim of erythema, grittiness, and scale; scattered on the patient's bilateral legs and face, there are multiple erythematous, gritty, scaly macules

## 2025-04-29 NOTE — PROGRESS NOTES
"Rosie Nunez J Call \"Marcin\" is a 79 y.o. male who presents for the following: Skin Check.  He notes a rash on his right buttock, which he states he noticed yesterday morning and has been red, puffy, itching, and burning.  He denies any other areas of involvement.  He also notes a scaly bump on his right forearm, which has been present for a few months, has increased in size, and does not heal.  He denies any other new, changing, or concerning skin lesions since his last visit; no bleeding, itching, or burning lesions.      Review of Systems:  No other skin or systemic complaints other than what is documented elsewhere in the note.    The following portions of the chart were reviewed this encounter and updated as appropriate:       Skin Cancer History  Biopsy Log Book  No skin cancers from Specimen Tracking.    Additional History      Specialty Problems          Dermatology Problems    Actinic keratosis    Hemangioma of skin and subcutaneous tissue    Melanocytic nevi of other parts of face    Melanocytic nevi of scalp and neck    Melanocytic nevi of trunk    Melanocytic nevi of unspecified lower limb, including hip    Melanocytic nevi of unspecified upper limb, including shoulder    Neoplasm of uncertain behavior of skin    Other melanin hyperpigmentation    Personal history of other malignant neoplasm of skin    Rosacea, unspecified    Scar condition and fibrosis of skin    Skin changes due to chronic exposure to nonionizing radiation, unspecified    Eczema    Rash       Past Dermatologic / Past Relevant Medical History:    - history of atypical melanocytic hyperplasia, for which early melanoma in situ, lentigo maligna type, 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 King on 4/30/24    - AKs    - mildly dysplastic junctional nevus on mid lower back on 1/25/18    - reported history of basal cell carcinoma with " squamous features on left ear superior helix s/p Mohs surgery by Dr. Robb Barry in July 2007 and then by Dr. 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    Current Medications / CAM's:  Current Medications[1]     Objective   Well appearing patient in no apparent distress; mood and affect are within normal limits.    A full examination was performed including scalp, face, eyes, ears, nose, lips, neck, chest, axillae, abdomen, back, bilateral upper extremities, and bilateral lower extremities. All findings within normal limits unless otherwise noted below.    Assessment/Plan   Skin Exam  1. RASH AND OTHER NONSPECIFIC SKIN ERUPTION  Right Buttock  On his right buttock, there are a few similar-appearing erythematous, edematous plaques with multiple vesicles within the plaques.  There are no other areas of involvement noted.  Erythematous plaques with vesicles - right buttock.  The clinical differential diagnosis for these lesions includes possibly herpes simplex virus (HSV) infection versus herpes zoster (shingles).  The viral nature of each of these conditions and management options, including possible biopsy for further diagnostic evaluation as well as viral culture, were discussed extensively with the patient today.  After discussing the risks and benefits of various management options, the patient expressed understanding and wishes to have a swab sent for viral culture for further  diagnostic evaluation.  In the meantime, while we await virology results, I recommend empiric oral anti-viral therapy for possible shingles with a 7-day course of Valtrex 1 gram PO TID.  The risks, benefits, and side effects of this medication were discussed.  The patient expressed understanding and is in agreement with this plan.  valACYclovir (Valtrex) 1 gram tablet - Right Buttock  Take 1 tablet (1,000 mg) by mouth 3 times a day for 7 days.  Related Procedures  QUEST HSV, TYPE 1 AND 2 DNA, QL REAL TIME PCR  VZV By PCR Qualitative Skin/Mucosa Lesion  2. NEOPLASM OF UNCERTAIN BEHAVIOR OF SKIN (2)  Right Proximal Dorsal Forearm  7 mm erythematous, scaly papule     Shave removal    Lesion length (cm):  0.7  Margin per side (cm):  0  Lesion diameter (cm):  0.7  Informed consent: discussed and consent obtained    Timeout: patient name, date of birth, surgical site, and procedure verified    Procedure prep:  Patient was prepped and draped  Anesthesia: the lesion was anesthetized in a standard fashion    Anesthetic:  1% lidocaine w/ epinephrine 1-100,000 local infiltration  Instrument used: flexible razor blade    Hemostasis achieved with: aluminum chloride    Outcome: patient tolerated procedure well    Post-procedure details: sterile dressing applied and wound care instructions given    Dressing type: bandage and petrolatum      Staff Communication: Dermatology Local Anesthesia: 1 % Lidocaine / Epinephrine - Amount:0.5ml  Specimen 1 - Dermatopathology- DERM LAB  Differential Diagnosis: SCCIS v HAK v ISK v BCC  Check Margins Yes/No?:    Comments:    Dermpath Lab: Routine Histopathology (formalin-fixed tissue)  Right Anterior Medial Distal Leg  1.7 cm erythematous, scaly plaque  Lesion biopsy  Type of biopsy: tangential    Informed consent: discussed and consent obtained    Timeout: patient name, date of birth, surgical site, and procedure verified    Procedure prep:  Patient was prepped and draped  Anesthesia: the  lesion was anesthetized in a standard fashion    Anesthetic:  1% lidocaine w/ epinephrine 1-100,000 local infiltration  Instrument used: flexible razor blade    Hemostasis achieved with: aluminum chloride    Outcome: patient tolerated procedure well    Post-procedure details: wound care instructions given      Staff Communication: Dermatology Local Anesthesia: 1 % Lidocaine / Epinephrine - Amount:0.5ml  Specimen 2 - Dermatopathology- DERM LAB  Differential Diagnosis: SK v SCCIS v AK v BCC  Check Margins Yes/No?:    Comments:    Dermpath Lab: Routine Histopathology (formalin-fixed tissue)  3. ACTINIC KERATOSIS (17)  Right Lower Leg - Anterior (17)  On the patient's right medial distal leg, there is a pink, well-healed scar at the recent biopsy site with a surrounding rim of erythema, grittiness, and scale; scattered on the patient's bilateral legs and face, there are multiple erythematous, gritty, scaly macules  Biopsy-proven Actinic Keratosis and additional AKs -right medial distal leg, present on the deep and peripheral margins, and scattered on bilateral legs and face, respectively.  The pre-cancerous nature of these lesions and treatment options were discussed with the patient today.  At this time, I recommend treatment with liquid nitrogen cryotherapy.  The patient expressed understanding, is in agreement with this plan, and wishes to proceed with cryotherapy today.  Destr of lesion - Right Lower Leg - Anterior (17)  Complexity: simple    Destruction method: cryotherapy    Informed consent: discussed and consent obtained    Lesion destroyed using liquid nitrogen: Yes    Cryotherapy cycles:  1  Outcome: patient tolerated procedure well with no complications    Post-procedure details: wound care instructions given    4. MELANOCYTIC NEVUS OF TRUNK  Generalized  Scattered on the patient's face, neck, trunk, and extremities, there are several small, round- to oval-shaped, brown-pigmented and pink-colored, symmetric,  uniform-appearing macules and dome-shaped papules  Clinically benign- to slightly atypical-appearing nevi - the clinically benign- to slightly atypical-appearing nature of the patient's nevi was discussed with the patient today.  None of the patient's nevi meet threshold for biopsy today.  I emphasized the importance of performing monthly self-skin exams using the ABCDs of monitoring moles, which were reviewed with the patient today and an informational hand-out provided.  I also emphasized the importance of sun avoidance and sun protection with daily sunscreen use.  The patient expressed understanding and is in agreement with this plan.  5. SEBORRHEIC KERATOSIS  Generalized  Scattered on the patient's face, neck, trunk, and extremities, there are multiple tan- to light brown-colored, hyperkeratotic, stuck-on appearing papules of varying size and shape  Seborrheic Keratoses - the benign nature of these lesions was discussed with the patient today and reassurance provided.  No treatment is medically indicated for these lesions at this time.  6. HEMANGIOMA OF SKIN  Generalized  Scattered on the patient's face, neck, trunk, and extremities, there are multiple small, round, cherry red- to purplish-colored, symmetric, uniform, vascular-appearing macules and papules  Cherry Angiomas - the benign nature of these vascular lesions was discussed with the patient today and reassurance provided.  No treatment is medically indicated for these lesions at this time.  7. PERSONAL HISTORY OF MALIGNANT MELANOMA OF SKIN  Generalized  The sites of prior melanoma excision were examined.  There is no evidence of recurrent growth or pigmentation or recurrent disease, satellite papules, or nodules on exam today.  History of melanoma in situ, lentigo maligna type, basal cell carcinoma, dysplastic nevus, and actinic keratoses and photodamage.  There is no evidence of local, regional, or distant recurrent disease or any new primary melanomas  on exam today.  I emphasized the importance of continuing to perform monthly self-skin and lymph node exams using the ABCDs of monitoring moles, which were reviewed with the patient, as well as the importance of sun avoidance and sun protection with daily sunscreen use.  I will have the patient return to our office in 4 to 6 months, pending the above biopsy results, for routine melanoma follow-up and total body skin exam, and the patient was instructed to call our office should the patient notice any new, changing, symptomatic, or otherwise concerning skin lesions before then.  The patient expressed understanding and is in agreement with this plan.  8. DIFFUSE PHOTODAMAGE OF SKIN  Photodistributed  Diffuse photodamage with actinic changes with telangiectasia and mottled pigmentation in sun-exposed areas.  Photodamage.  The signs and symptoms of skin cancer were reviewed and the patient was advised to practice sun protection and sun avoidance, use daily sunscreen, and perform regular self skin exams.  Sun protection was discussed, including avoiding the mid-day sun, wearing a sunscreen with SPF at least 50, and stressing the need for reapplication of sunscreen and applying more than they think they need.          [1]   Current Outpatient Medications:     atorvastatin (Lipitor) 40 mg tablet, TAKE 1 TABLET BY MOUTH ONCE  DAILY AT BEDTIME, Disp: 100 tablet, Rfl: 1    lisinopriL-hydrochlorothiazide 20-25 mg tablet, TAKE 1 TABLET BY MOUTH ONCE  DAILY, Disp: 90 tablet, Rfl: 1    meloxicam (Mobic) 15 mg tablet, Take 1 tablet (15 mg) by mouth once daily. TAKE WITH FOOD, Disp: 90 tablet, Rfl: 3    saw palmetto 450 mg capsule, Take 1 capsule (450 mg) by mouth once daily., Disp: , Rfl:     tamsulosin (Flomax) 0.4 mg 24 hr capsule, Take 1 capsule (0.4 mg) by mouth once daily at bedtime. AT BEDTIME, Disp: 90 capsule, Rfl: 1    triamcinolone (Kenalog) 0.1 % cream, Apply 1 Application topically 2 times a day., Disp: , Rfl:      valACYclovir (Valtrex) 1 gram tablet, Take 1 tablet (1,000 mg) by mouth 3 times a day for 7 days., Disp: 21 tablet, Rfl: 0

## 2025-05-01 LAB
HSV1 DNA SPEC QL NAA+PROBE: NOT DETECTED
HSV2 DNA SPEC QL NAA+PROBE: DETECTED
SPECIMEN SOURCE: ABNORMAL
SPECIMEN SOURCE: NORMAL
VZV DNA SPEC QL NAA+PROBE: NOT DETECTED

## 2025-05-19 ENCOUNTER — APPOINTMENT (OUTPATIENT)
Dept: DERMATOLOGY | Facility: CLINIC | Age: 80
End: 2025-05-19
Payer: MEDICARE

## 2025-05-19 DIAGNOSIS — B00.9 HERPESVIRAL INFECTION: ICD-10-CM

## 2025-05-19 DIAGNOSIS — D09.9 SQUAMOUS CELL CARCINOMA IN SITU: Primary | ICD-10-CM

## 2025-05-19 DIAGNOSIS — L57.8 DIFFUSE PHOTODAMAGE OF SKIN: ICD-10-CM

## 2025-05-19 DIAGNOSIS — D22.60 MELANOCYTIC NEVUS OF UPPER EXTREMITY, UNSPECIFIED LATERALITY: ICD-10-CM

## 2025-05-19 DIAGNOSIS — L82.1 SEBORRHEIC KERATOSIS: ICD-10-CM

## 2025-05-19 PROCEDURE — 99214 OFFICE O/P EST MOD 30 MIN: CPT | Performed by: DERMATOLOGY

## 2025-05-19 PROCEDURE — 17263 DSTRJ MAL LES T/A/L 2.1-3.0: CPT | Performed by: DERMATOLOGY

## 2025-05-19 RX ORDER — VALACYCLOVIR HYDROCHLORIDE 1 G/1
TABLET, FILM COATED ORAL
Qty: 16 TABLET | Refills: 0 | Status: SHIPPED | OUTPATIENT
Start: 2025-05-19

## 2025-05-19 ASSESSMENT — DERMATOLOGY QUALITY OF LIFE (QOL) ASSESSMENT
RATE HOW BOTHERED YOU ARE BY SYMPTOMS OF YOUR SKIN PROBLEM (EG, ITCHING, STINGING BURNING, HURTING OR SKIN IRRITATION): 0 - NEVER BOTHERED
RATE HOW EMOTIONALLY BOTHERED YOU ARE BY YOUR SKIN PROBLEM (FOR EXAMPLE, WORRY, EMBARRASSMENT, FRUSTRATION): 0 - NEVER BOTHERED
ARE THERE EXCLUSIONS OR EXCEPTIONS FOR THE QUALITY OF LIFE ASSESSMENT: NO
RATE HOW BOTHERED YOU ARE BY EFFECTS OF YOUR SKIN PROBLEMS ON YOUR ACTIVITIES (EG, GOING OUT, ACCOMPLISHING WHAT YOU WANT, WORK ACTIVITIES OR YOUR RELATIONSHIPS WITH OTHERS): 0 - NEVER BOTHERED
DATE THE QUALITY-OF-LIFE ASSESSMENT WAS COMPLETED: 67344
WHAT SINGLE SKIN CONDITION LISTED BELOW IS THE PATIENT ANSWERING THE QUALITY-OF-LIFE ASSESSMENT QUESTIONS ABOUT: NONE OF THE ABOVE

## 2025-05-19 ASSESSMENT — DERMATOLOGY PATIENT ASSESSMENT
DO YOU USE A TANNING BED: NO
DO YOU USE SUNSCREEN: OCCASIONALLY
DO YOU HAVE ANY NEW OR CHANGING LESIONS: NO

## 2025-05-19 ASSESSMENT — PATIENT GLOBAL ASSESSMENT (PGA): PATIENT GLOBAL ASSESSMENT: PATIENT GLOBAL ASSESSMENT:  1 - CLEAR

## 2025-05-19 ASSESSMENT — ITCH NUMERIC RATING SCALE: HOW SEVERE IS YOUR ITCHING?: 0

## 2025-05-19 NOTE — Clinical Note
Scattered on the patient's face, neck, and bilateral upper extremities and buttocks, there are several tan- to light brown-colored, hyperkeratotic, stuck-on appearing papules of varying size and shape

## 2025-05-19 NOTE — PROGRESS NOTES
"Rosie PACHECO Call \"Marcin\" is a 80 y.o. male who presents for the following: Discuss recent biopsy result and treatment options and Follow-up.  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  Of note, biopsy of a second suspicious lesion on his right anterior medial distal leg also performed at that visit revealed subacute spongiotic dermatitis, and a swab from his right buttock was sent for viral culture, which was positive for HSV-2.    Today, the patient states the biopsy sites healed well.  He also states the rash on his buttocks resolved, but it intermittently flares approximately once a month, according to the patient.  He denies any other areas of involvement.  He denies any new, changing, or concerning skin lesions since his last visit; no bleeding, itching, or burning lesions.      Review of Systems:  No other skin or systemic complaints other than what is documented elsewhere in the note.    The following portions of the chart were reviewed this encounter and updated as appropriate:       Skin Cancer History  Biopsy Log Book  No skin cancers from Specimen Tracking.    Additional History      Specialty Problems          Dermatology Problems    Actinic keratosis    Hemangioma of skin and subcutaneous tissue    Melanocytic nevi of other parts of face    Melanocytic nevi of scalp and neck    Melanocytic nevi of trunk    Melanocytic nevi of unspecified lower limb, including hip    Melanocytic nevi of unspecified upper limb, including shoulder    Neoplasm of uncertain behavior of skin    Other melanin hyperpigmentation    Personal history of other malignant neoplasm of skin    Rosacea, unspecified    Scar condition and fibrosis of skin    Skin changes due to chronic exposure to nonionizing radiation, unspecified    Eczema    Rash       Past Dermatologic / Past Relevant Medical History:    - " history of atypical melanocytic hyperplasia, for which early melanoma in situ, lentigo maligna type, 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 King on 4/30/24    - recent biopsy-proven SCC in situ on right proximal dorsal forearm diagnosed at last visit on 4/28/25 and pending definitive treatment    - AKs    - mildly dysplastic junctional nevus on mid lower back on 1/25/18    - reported history of basal cell carcinoma with squamous features on left ear superior helix s/p Mohs surgery by Dr. Robb Barry in July 2007 and then by Dr. 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    Current Medications / CAM's:  Current Medications[1]     Objective   Well appearing patient in no apparent distress; mood and affect are within normal limits.    A skin examination was performed including: Face, neck, and bilateral upper extremities and buttocks. All findings within normal limits unless otherwise noted below.    Assessment/Plan   Skin Exam  1. SQUAMOUS CELL CARCINOMA IN SITU  Right proximal dorsal forearm  Pink, well-healed scar at his recent biopsy site  Destr of lesion    Destruction method: electrodesiccation and curettage    Informed consent: discussed and consent obtained    Timeout:  patient name, date of birth, surgical site, and procedure  verified  Procedure prep:  Patient was prepped and draped  Anesthesia: the lesion was anesthetized in a standard fashion    Anesthetic:  1% lidocaine w/ epinephrine 1-100,000 local infiltration  Curettage performed in three different directions: Yes    Electrodesiccation performed over the curetted area: Yes    Curettage cycles:  3  Lesion length (cm):  1  Lesion width (cm):  1  Margin per side (cm):  0.6  Final wound size (cm):  2.2  Hemostasis achieved with:  electrodesiccation  Outcome: patient tolerated procedure well with no complications    Post-procedure details: sterile dressing applied and wound care instructions given    Dressing type: bandage and petrolatum      Staff Communication: Dermatology Local Anesthesia: 1 % Lidocaine / Epinephrine - Amount:0.5ml  Biopsy-proven squamous cell carcinoma in situ - right proximal dorsal forearm; diagnosed at last visit on 4/28/25 and pending definitive treatment.  The malignant nature of SCC in situ, the need for further definitive treatment, and treatment options, including Mohs surgery, wide local excision, and Electrodesiccation & Curettage, were discussed extensively with the patient today.  After discussing the risks and benefits of each option, including the differences in cure rates and permanent scar results, the patient expressed understanding and wishes to proceed with definitive treatment with ED&C today.  I will have the patient return to our office in 3 months for re-evaluation of the ED&C site as well as routine follow-up and skin exam, and the patient was instructed to call should they notice any new, changing, symptomatic, or concerning skin lesions before then.  The patient expressed understanding, is in agreement with this plan, and wishes to proceed with the ED&C today.  2. HERPESVIRAL INFECTION  Right Buttock  On his right buttock, there are several pink to hyperpigmented macules and patches consistent with postinflammatory hyperpigmentation.   There are no vesicles or bullae on exam today.  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 The viral nature of this condition and treatment options were discussed extensively with the patient in the office today.  After discussing the risks and benefits of various treatment options, specifically topical versus oral anti-viral therapy, such as with Valtrex, and episodic versus chronic suppressive therapy, the patient expressed understanding and wishes to begin episodic oral anti-viral therapy with Valtrex.  Thus, I recommend Valtrex 2 grams PO BID for 1 day as needed for outbreaks; the patient may repeat treatment as needed for recurrent flares.  The risks, benefits, and side effects of this medication were discussed.  The patient expressed understanding and is in agreement with this plan.  valACYclovir (Valtrex) 1 gram tablet - Right Buttock  Take 2 tablets by mouth twice daily for 1 day as needed for outbreaks  3. MELANOCYTIC NEVUS OF UPPER EXTREMITY, UNSPECIFIED LATERALITY  Right Forearm - Posterior  Scattered on the patient's face, neck, and bilateral upper extremities and buttocks, there are several small, round- to oval-shaped, brown-pigmented and pink-colored, symmetric, uniform-appearing macules and dome-shaped papules  Clinically benign- to slightly atypical-appearing nevi - the clinically benign- to slightly atypical-appearing nature of the patient's nevi was discussed with the patient today.  None of the patient's nevi meet threshold for biopsy today.  I emphasized the importance of performing monthly self-skin exams using the ABCDs of monitoring moles, which were reviewed with the patient today and an informational hand-out provided.  I also emphasized the importance of sun avoidance and sun protection with daily sunscreen use.  The patient expressed understanding and is in agreement with this plan.  4. SEBORRHEIC KERATOSIS  Generalized  Scattered  on the patient's face, neck, and bilateral upper extremities and buttocks, there are several tan- to light brown-colored, hyperkeratotic, stuck-on appearing papules of varying size and shape  Seborrheic Keratoses - the benign nature of these lesions was discussed with the patient today and reassurance provided.  No treatment is medically indicated for these lesions at this time.  5. DIFFUSE PHOTODAMAGE OF SKIN  Photodistributed  Diffuse photodamage with actinic changes with telangiectasia and mottled pigmentation in sun-exposed areas.  History of melanoma in situ, lentigo maligna type, nonmelanoma skin cancers, dysplastic nevus, and actinic keratoses and photodamage.  The patient was recently seen in our office for routine melanoma follow-up and total body skin exam on 4/28/25, at which time no evidence of recurrence was noted.  I emphasized the importance of continuing to perform monthly self-skin and lymph node exams using the ABCDs of monitoring moles, which were reviewed with the patient, as well as the importance of sun avoidance and sun protection with daily sunscreen use.  I will have the patient return to our office in 4 to 6 months from the date of his last visit for routine melanoma follow-up and total body skin exam, and the patient was instructed to call our office should the patient notice any new, changing, symptomatic, or otherwise concerning skin lesions before then.  The patient expressed understanding and is in agreement with this plan.        I spent greater than 30 minutes discussing the patient's diagnosis and treatment plan with him today.         [1]   Current Outpatient Medications:     atorvastatin (Lipitor) 40 mg tablet, TAKE 1 TABLET BY MOUTH ONCE  DAILY AT BEDTIME, Disp: 100 tablet, Rfl: 1    lisinopriL-hydrochlorothiazide 20-25 mg tablet, TAKE 1 TABLET BY MOUTH ONCE  DAILY, Disp: 90 tablet, Rfl: 1    meloxicam (Mobic) 15 mg tablet, Take 1 tablet (15 mg) by mouth once daily. TAKE WITH  FOOD, Disp: 90 tablet, Rfl: 3    saw palmetto 450 mg capsule, Take 1 capsule (450 mg) by mouth once daily., Disp: , Rfl:     tamsulosin (Flomax) 0.4 mg 24 hr capsule, Take 1 capsule (0.4 mg) by mouth once daily at bedtime. AT BEDTIME, Disp: 90 capsule, Rfl: 1    triamcinolone (Kenalog) 0.1 % cream, Apply 1 Application topically 2 times a day., Disp: , Rfl:     valACYclovir (Valtrex) 1 gram tablet, Take 2 tablets by mouth twice daily for 1 day as needed for outbreaks, Disp: 16 tablet, Rfl: 0

## 2025-05-19 NOTE — Clinical Note
On his right buttock, there are several pink to hyperpigmented macules and patches consistent with postinflammatory hyperpigmentation.  There are no vesicles or bullae on exam today.

## 2025-05-19 NOTE — Clinical Note
[facility] called. Pt had 5 beats of Vtach. Pt symptomatic feeling fluttering. States has felt this off and on for years. VSS. Cards notified. Scattered on the patient's face, neck, and bilateral upper extremities and buttocks, there are several small, round- to oval-shaped, brown-pigmented and pink-colored, symmetric, uniform-appearing macules and dome-shaped papules

## 2025-05-19 NOTE — Clinical Note
History of melanoma in situ, lentigo maligna type, nonmelanoma skin cancers, dysplastic nevus, and actinic keratoses and photodamage.  The patient was recently seen in our office for routine melanoma follow-up and total body skin exam on 4/28/25, at which time no evidence of recurrence was noted.  I emphasized the importance of continuing to perform monthly self-skin and lymph node exams using the ABCDs of monitoring moles, which were reviewed with the patient, as well as the importance of sun avoidance and sun protection with daily sunscreen use.  I will have the patient return to our office in 4 to 6 months from the date of his last visit for routine melanoma follow-up and total body skin exam, and the patient was instructed to call our office should the patient notice any new, changing, symptomatic, or otherwise concerning skin lesions before then.  The patient expressed understanding and is in agreement with this plan.

## 2025-05-19 NOTE — Clinical Note
Biopsy-proven squamous cell carcinoma in situ - right proximal dorsal forearm; diagnosed at last visit on 4/28/25 and pending definitive treatment.  The malignant nature of SCC in situ, the need for further definitive treatment, and treatment options, including Mohs surgery, wide local excision, and Electrodesiccation & Curettage, were discussed extensively with the patient today.  After discussing the risks and benefits of each option, including the differences in cure rates and permanent scar results, the patient expressed understanding and wishes to proceed with definitive treatment with ED&C today.  I will have the patient return to our office in 3 months for re-evaluation of the ED&C site as well as routine follow-up and skin exam, and the patient was instructed to call should they notice any new, changing, symptomatic, or concerning skin lesions before then.  The patient expressed understanding, is in agreement with this plan, and wishes to proceed with the ED&C today.

## 2025-05-19 NOTE — Clinical Note
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 The viral nature of this condition and treatment options were discussed extensively with the patient in the office today.  After discussing the risks and benefits of various treatment options, specifically topical versus oral anti-viral therapy, such as with Valtrex, and episodic versus chronic suppressive therapy, the patient expressed understanding and wishes to begin episodic oral anti-viral therapy with Valtrex.  Thus, I recommend Valtrex 2 grams PO BID for 1 day as needed for outbreaks; the patient may repeat treatment as needed for recurrent flares.  The risks, benefits, and side effects of this medication were discussed.  The patient expressed understanding and is in agreement with this plan.

## 2025-05-30 DIAGNOSIS — I10 BENIGN ESSENTIAL HTN: ICD-10-CM

## 2025-05-31 RX ORDER — LISINOPRIL AND HYDROCHLOROTHIAZIDE 20; 25 MG/1; MG/1
1 TABLET ORAL DAILY
Qty: 90 TABLET | Refills: 1 | Status: SHIPPED | OUTPATIENT
Start: 2025-05-31

## 2025-06-02 DIAGNOSIS — I10 BENIGN ESSENTIAL HTN: ICD-10-CM

## 2025-06-03 DIAGNOSIS — I10 BENIGN ESSENTIAL HTN: ICD-10-CM

## 2025-06-05 RX ORDER — TAMSULOSIN HYDROCHLORIDE 0.4 MG/1
0.4 CAPSULE ORAL NIGHTLY
Qty: 90 CAPSULE | Refills: 0 | Status: SHIPPED | OUTPATIENT
Start: 2025-06-05 | End: 2025-09-03

## 2025-06-05 RX ORDER — TAMSULOSIN HYDROCHLORIDE 0.4 MG/1
0.4 CAPSULE ORAL NIGHTLY
Qty: 90 CAPSULE | Refills: 1 | Status: SHIPPED | OUTPATIENT
Start: 2025-06-05 | End: 2026-06-05

## 2025-08-10 DIAGNOSIS — I10 BENIGN ESSENTIAL HTN: ICD-10-CM

## 2025-08-15 RX ORDER — LISINOPRIL AND HYDROCHLOROTHIAZIDE 20; 25 MG/1; MG/1
1 TABLET ORAL DAILY
Qty: 100 TABLET | Refills: 2 | Status: SHIPPED | OUTPATIENT
Start: 2025-08-15

## 2025-11-13 ENCOUNTER — APPOINTMENT (OUTPATIENT)
Dept: DERMATOLOGY | Facility: CLINIC | Age: 80
End: 2025-11-13
Payer: MEDICARE